# Patient Record
Sex: MALE
[De-identification: names, ages, dates, MRNs, and addresses within clinical notes are randomized per-mention and may not be internally consistent; named-entity substitution may affect disease eponyms.]

---

## 2017-11-15 ENCOUNTER — APPOINTMENT (OUTPATIENT)
Dept: UROLOGY | Facility: CLINIC | Age: 32
End: 2017-11-15
Payer: MEDICAID

## 2017-11-15 VITALS
BODY MASS INDEX: 26.51 KG/M2 | SYSTOLIC BLOOD PRESSURE: 107 MMHG | HEART RATE: 69 BPM | WEIGHT: 200 LBS | DIASTOLIC BLOOD PRESSURE: 68 MMHG | TEMPERATURE: 97.6 F | HEIGHT: 73 IN | OXYGEN SATURATION: 98 %

## 2017-11-15 PROCEDURE — 99203 OFFICE O/P NEW LOW 30 MIN: CPT

## 2018-01-25 ENCOUNTER — APPOINTMENT (OUTPATIENT)
Dept: UROLOGY | Facility: CLINIC | Age: 33
End: 2018-01-25
Payer: MEDICAID

## 2018-01-25 DIAGNOSIS — N44.2 BENIGN CYST OF TESTIS: ICD-10-CM

## 2018-01-25 PROCEDURE — 99213 OFFICE O/P EST LOW 20 MIN: CPT | Mod: 25

## 2018-01-25 PROCEDURE — 76870 US EXAM SCROTUM: CPT

## 2018-01-26 LAB
FSH SERPL-MCNC: 5.2 IU/L
LH SERPL-ACNC: 3.6 IU/L
TESTOST SERPL-MCNC: 192 NG/DL

## 2018-01-29 ENCOUNTER — MESSAGE (OUTPATIENT)
Age: 33
End: 2018-01-29

## 2018-02-21 ENCOUNTER — APPOINTMENT (OUTPATIENT)
Dept: UROLOGY | Facility: CLINIC | Age: 33
End: 2018-02-21

## 2019-03-25 ENCOUNTER — APPOINTMENT (OUTPATIENT)
Dept: FAMILY MEDICINE | Facility: CLINIC | Age: 34
End: 2019-03-25
Payer: MEDICAID

## 2019-03-25 VITALS
BODY MASS INDEX: 26.24 KG/M2 | SYSTOLIC BLOOD PRESSURE: 120 MMHG | HEART RATE: 68 BPM | HEIGHT: 73 IN | TEMPERATURE: 97.5 F | WEIGHT: 198 LBS | DIASTOLIC BLOOD PRESSURE: 80 MMHG | OXYGEN SATURATION: 98 % | RESPIRATION RATE: 16 BRPM

## 2019-03-25 DIAGNOSIS — Z13.1 ENCOUNTER FOR SCREENING FOR DIABETES MELLITUS: ICD-10-CM

## 2019-03-25 DIAGNOSIS — Z11.4 ENCOUNTER FOR SCREENING FOR HUMAN IMMUNODEFICIENCY VIRUS [HIV]: ICD-10-CM

## 2019-03-25 DIAGNOSIS — Z87.438 PERSONAL HISTORY OF OTHER DISEASES OF MALE GENITAL ORGANS: ICD-10-CM

## 2019-03-25 DIAGNOSIS — Z71.6 TOBACCO ABUSE COUNSELING: ICD-10-CM

## 2019-03-25 DIAGNOSIS — F17.200 NICOTINE DEPENDENCE, UNSPECIFIED, UNCOMPLICATED: ICD-10-CM

## 2019-03-25 DIAGNOSIS — Z83.49 FAMILY HISTORY OF OTHER ENDOCRINE, NUTRITIONAL AND METABOLIC DISEASES: ICD-10-CM

## 2019-03-25 DIAGNOSIS — Z86.39 PERSONAL HISTORY OF OTHER ENDOCRINE, NUTRITIONAL AND METABOLIC DISEASE: ICD-10-CM

## 2019-03-25 PROCEDURE — G0444 DEPRESSION SCREEN ANNUAL: CPT | Mod: 59

## 2019-03-25 PROCEDURE — 99406 BEHAV CHNG SMOKING 3-10 MIN: CPT

## 2019-03-25 PROCEDURE — G0442 ANNUAL ALCOHOL SCREEN 15 MIN: CPT

## 2019-03-25 PROCEDURE — 99385 PREV VISIT NEW AGE 18-39: CPT | Mod: 25

## 2019-03-25 PROCEDURE — 36415 COLL VENOUS BLD VENIPUNCTURE: CPT

## 2019-03-25 NOTE — ASSESSMENT
[FreeTextEntry1] : Health maintenance\par - comprehensive labs\par - negative depression and alcohol screening\par - declines vaccines \par \par Tonsillitis\par - swabbed at urgent care for strep and mono which were negative \par - completed course of abx and steroids\par - recommend CT neck and follow up with ENT\par \par Hyperhidrosis \par - stable\par - conservative management \par \par Smoking cessation\par - discussed smoking habits and cessation\par - patient does desire to quit\par - will try on his own without any medication

## 2019-03-25 NOTE — COUNSELING
[Healthy eating counseling provided] : healthy eating [Activity counseling provided] : activity [Good understanding] : Patient has a good understanding of disease, goals and obesity follow-up plan [Tobacco Use Cessation Intermediate Greater Than 3 Minutes Up to 10 Minutes] : Tobacco Use Cessation Intermediate Greater Than 3 Minutes Up to 10 Minutes [ - Annual Depression Screening] : Annual Depression Screening [de-identified] : diet - no restrictions\par exercise - on and off

## 2019-03-25 NOTE — HEALTH RISK ASSESSMENT
[Good] : ~his/her~  mood as  good [6-10] : 6-10 [0] : 2) Feeling down, depressed, or hopeless: Not at all (0) [HIV Test offered] : HIV Test offered [None] : None [With Family] : lives with family [Employed] : employed [] :  [Sexually Active] : sexually active [Feels Safe at Home] : Feels safe at home [Fully functional (bathing, dressing, toileting, transferring, walking, feeding)] : Fully functional (bathing, dressing, toileting, transferring, walking, feeding) [Fully functional (using the telephone, shopping, preparing meals, housekeeping, doing laundry, using] : Fully functional and needs no help or supervision to perform IADLs (using the telephone, shopping, preparing meals, housekeeping, doing laundry, using transportation, managing medications and managing finances) [Smoke Detector] : smoke detector [Carbon Monoxide Detector] : carbon monoxide detector [Seat Belt] :  uses seat belt [With Patient/Caregiver] : With Patient/Caregiver [Designated Healthcare Proxy] : Designated healthcare proxy [Name: ___] : Health Care Proxy's Name: [unfilled]  [Relationship: ___] : Relationship: [unfilled] [No falls in past year] : Patient reported no falls in the past year [] : No [[id number]] : 0 [Change in mental status noted] : No change in mental status noted [Language] : denies difficulty with language [Behavior] : denies difficulty with behavior [Reasoning] : denies difficulty with reasoning [High Risk Behavior] : no high risk behavior [Reports changes in hearing] : Reports no changes in hearing [Reports changes in vision] : Reports no changes in vision [Reports changes in dental health] : Reports no changes in dental health [Sunscreen] : does not use sunscreen [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

## 2019-03-25 NOTE — PHYSICAL EXAM

## 2019-03-25 NOTE — HISTORY OF PRESENT ILLNESS
[FreeTextEntry1] : cpe [de-identified] : Patient is here today for a physical.  \par He mentions that in the past 8-9 weeks he has had swelling of the right tonsil.  He initially also had sinus congestion and ear pain but those symptoms seem to be improving.  He went to urgent care when it first started and he was given amoxicillin.  He went a second time when he did not get better and was given steroids.  He still feels that his tonsil is very large on the right side. He denies any pain.

## 2019-03-28 LAB
25(OH)D3 SERPL-MCNC: 14.1 NG/ML
ALBUMIN SERPL ELPH-MCNC: 4.7 G/DL
ALP BLD-CCNC: 74 U/L
ALT SERPL-CCNC: 57 U/L
ANION GAP SERPL CALC-SCNC: 13 MMOL/L
APPEARANCE: CLEAR
AST SERPL-CCNC: 23 U/L
BASOPHILS # BLD AUTO: 0.06 K/UL
BASOPHILS NFR BLD AUTO: 0.7 %
BILIRUB SERPL-MCNC: 0.2 MG/DL
BILIRUBIN URINE: NEGATIVE
BLOOD URINE: NEGATIVE
BUN SERPL-MCNC: 21 MG/DL
CALCIUM SERPL-MCNC: 9.8 MG/DL
CHLORIDE SERPL-SCNC: 104 MMOL/L
CHOLEST SERPL-MCNC: 252 MG/DL
CHOLEST/HDLC SERPL: 5.4 RATIO
CO2 SERPL-SCNC: 24 MMOL/L
COLOR: YELLOW
CREAT SERPL-MCNC: 0.86 MG/DL
EOSINOPHIL # BLD AUTO: 0.41 K/UL
EOSINOPHIL NFR BLD AUTO: 5.1 %
ESTIMATED AVERAGE GLUCOSE: 117 MG/DL
GLUCOSE QUALITATIVE U: NEGATIVE
GLUCOSE SERPL-MCNC: 96 MG/DL
HBA1C MFR BLD HPLC: 5.7 %
HCT VFR BLD CALC: 47.2 %
HDLC SERPL-MCNC: 47 MG/DL
HGB BLD-MCNC: 14.9 G/DL
HIV1+2 AB SPEC QL IA.RAPID: NONREACTIVE
IMM GRANULOCYTES NFR BLD AUTO: 0.4 %
KETONES URINE: NEGATIVE
LDLC SERPL CALC-MCNC: 172 MG/DL
LEUKOCYTE ESTERASE URINE: NEGATIVE
LYMPHOCYTES # BLD AUTO: 3.23 K/UL
LYMPHOCYTES NFR BLD AUTO: 39.8 %
MAN DIFF?: NORMAL
MCHC RBC-ENTMCNC: 27.5 PG
MCHC RBC-ENTMCNC: 31.6 GM/DL
MCV RBC AUTO: 87.2 FL
MONOCYTES # BLD AUTO: 0.68 K/UL
MONOCYTES NFR BLD AUTO: 8.4 %
NEUTROPHILS # BLD AUTO: 3.7 K/UL
NEUTROPHILS NFR BLD AUTO: 45.6 %
NITRITE URINE: NEGATIVE
PH URINE: 6
PLATELET # BLD AUTO: 259 K/UL
POTASSIUM SERPL-SCNC: 4.3 MMOL/L
PROT SERPL-MCNC: 7.5 G/DL
PROTEIN URINE: NORMAL
RBC # BLD: 5.41 M/UL
RBC # FLD: 14.4 %
SODIUM SERPL-SCNC: 141 MMOL/L
SPECIFIC GRAVITY URINE: 1.03
T4 FREE SERPL-MCNC: 1.1 NG/DL
TRIGL SERPL-MCNC: 167 MG/DL
TSH SERPL-ACNC: 2.37 UIU/ML
UROBILINOGEN URINE: NORMAL
WBC # FLD AUTO: 8.11 K/UL

## 2019-04-24 ENCOUNTER — APPOINTMENT (OUTPATIENT)
Dept: OTOLARYNGOLOGY | Facility: CLINIC | Age: 34
End: 2019-04-24
Payer: MEDICAID

## 2019-04-24 VITALS
HEIGHT: 73 IN | HEART RATE: 64 BPM | DIASTOLIC BLOOD PRESSURE: 86 MMHG | SYSTOLIC BLOOD PRESSURE: 126 MMHG | WEIGHT: 198 LBS | BODY MASS INDEX: 26.24 KG/M2

## 2019-04-24 DIAGNOSIS — Z87.891 PERSONAL HISTORY OF NICOTINE DEPENDENCE: ICD-10-CM

## 2019-04-24 PROCEDURE — 99204 OFFICE O/P NEW MOD 45 MIN: CPT | Mod: 25

## 2019-04-24 PROCEDURE — 31575 DIAGNOSTIC LARYNGOSCOPY: CPT

## 2019-04-24 NOTE — HISTORY OF PRESENT ILLNESS
[No] : patient does not have a  history of radiation therapy [Tinnitus] : tinnitus [Otalgia] : otalgia [Nasal Congestion] : nasal congestion [Ear Pain] : ear pain [Sore Throat] : sore throat [Painful Swallowing] : painful swallowing [None] : No risk factors have been identified. [de-identified] : 33 y/o male smoker (20+years) w/ hx of a septoplasty when he was 14 y/o who states about 8 weeks ago he started experiencing a very bad sharp R sided headache which then led to intermittent R sided sharp ear pain. He then noticed his R tonsil was swollen after brushing his teeth and has been having swelling and discomfort in his R tonsil since. He states he also has been having ringing in the R ear for the past couple of days. He was seen by urgent care who gave him amoxicillin for 10 days but his symptoms did not resolve. He was then put on oral steroids which did not help him.  He also had a strep and flu test done which was negative. He then was sent for a CT of the neck that showed mild asymmetric soft tissue thickening in the left nasopharyngeal wall - only has report not the CD. He states he has also been experiencing intermittent  off balance and lightheaded when he bends down or with diff head positions that has been going on for about 1-2 years. He states he has been having mild to moderate R>L nasal congestion. Pt has no ear drainage, hearing loss, nasal discharge, epistaxis, sinus infections, facial pain, facial pressure, dysphagia, fevers or weight loss.\par  [de-identified] : septoplasty  [Anxiety] : no anxiety [Dizziness] : no dizziness [Headache] : no headache [Otorrhea] : no otorrhea [Vertigo] : no vertigo [Hearing Loss] : no hearing loss [Otitis Media with Effusion] : no otitis media with effusion [Recurrent Otitis Media] : no recurrent otitis media [Presbycusis] : no presbycusis [Congenital Ear Malformation] : no congenital ear malformation [Meniere Disease] : no Meniere disease [Otosclerosis] : no otosclerosis [Perilymphatic Fistula] : no perilymphatic fistula [Hypertension] : no hypertension [Early Onset Hearing Loss] : no early onset hearing loss [Smoking] : no smoking [Loud Noise Exposure] : no history of loud noise exposure [Stroke] : no stroke [Clear Rhinorrhea] : no clear rhinorrhea [Facial Pain] : no facial pain [Purulent Rhinorrhea] : no purulent rhinorrhea [Facial Pressure] : no facial pressure [Postnasal Drainage] : no postnasal drainage [Ear Pressure] : no ear pressure [Ear Fullness] : no ear fullness [Diplopia] : no diplopia [Allergic Rhinitis] : no allergic rhinitis [Environmental Allergies] : no environmental allergies [Seasonal Allergies] : no seasonal allergies [Environmental Allergens] : no environmental allergens [Allergies] : no allergies [Adenoidectomy] : no adenoidectomy [Asthma] : no asthma [Neck Pain] : no neck pain [Neck Redness] : no neck redness [Neck Mass] : no neck mass [Cold Intolerance] : no cold intolerance [Chills] : no chills [Fatigue] : no fatigue [Cough] : no cough [Heat Intolerance] : no heat intolerance [Hyperthyroidism] : no hyperthyroidism [Sialadenitis] : no sialadenitis [Hodgkin Disease] : no hodgkin disease [Non-Hodgkin Lymphoma] : no non-hodgkin lymphoma [Alcohol Use] : no alcohol use [Tobacco Use] : no tobacco use [Thyroid Cancer] : no thyroid cancer [Graves Disease] : no graves disease

## 2019-04-24 NOTE — PROCEDURE
[de-identified] : reason for laryngoscopy: unable to cooperate with mirror \par \par Fiberoptic laryngoscopy was performed on the patient.  R/b/a was explained to the patient and they agreed to proceed with procedure.  Nasal cavities were treated with lidocaine and afrin prior to laryngoscopy for comfort.  Nasal cavities: clear b/l, Nasopharynx: mild erythema and swelling + cobblestoning, Oropharynx/Hypopharynx:  tonsils appear symmetrically enlarged posteriorly, + lingual tonsillar hypertrophy no masses or lesions, posterior pharyngeal wall with cobblestoning.  No BOT hypertrophy, vallecula is clear of any masses or cysts, Supraglottis: epiglottis sharp with normal bilateral arytenoids, aryepiglottic folds, ventricles but with + interarytenoid edema consistent with reflux, Glottis: clear, TVCs mobile b/l.  Subglottis clear  No pooling of secretions in the pyriform sinus.  Airway patent\par \par Scope #:7\par \par

## 2019-04-24 NOTE — REASON FOR VISIT
[Initial Evaluation] : an initial evaluation for [Ear Pain] : ear pain [Tinnitus] : tinnitus [FreeTextEntry2] : R tonsil swelling and discomfort

## 2019-04-24 NOTE — ASSESSMENT
[FreeTextEntry1] : 33 y/o male w/ R sided Tonsil swelling and discomfort when swallowing w/ associated R sided headache and ear pain.  Significant reflux on exam which is likely the cause of symptoms, but given the tonsil asymmetry and the smoking history would proceed with biopsy if symptoms don’t resolve with reflux therapy.  Advised that with a CT scan which demonstrates no tonsillar mass - isolated blind tonsil biopsy will not be helpful and would recommend tonsillectomy to allow for complete biopsy.  Patient understands and will proceed with tonsillectomy if unimproved with 2 weeks of PPI \par \par GERD- Antireflux recommendations were given to the patient\par Rx: Omeprazole 40mg daily \par \par - f/up 2 weeks\par \par

## 2019-04-24 NOTE — PHYSICAL EXAM
[Midline] : trachea located in midline position [Normal] : no rashes [Nasal Endoscopy Performed] : nasal endoscopy was performed, see procedure section for findings [Laryngoscopy Performed] : laryngoscopy was performed, see procedure section for findings [de-identified] : R tonsil swelling and erythema, slightly enlarged compared to left but soft to palpation

## 2019-05-10 ENCOUNTER — APPOINTMENT (OUTPATIENT)
Dept: OTOLARYNGOLOGY | Facility: CLINIC | Age: 34
End: 2019-05-10

## 2019-05-14 ENCOUNTER — APPOINTMENT (OUTPATIENT)
Dept: OTOLARYNGOLOGY | Facility: CLINIC | Age: 34
End: 2019-05-14
Payer: MEDICAID

## 2019-05-14 VITALS
SYSTOLIC BLOOD PRESSURE: 133 MMHG | WEIGHT: 198 LBS | HEIGHT: 73 IN | BODY MASS INDEX: 26.24 KG/M2 | DIASTOLIC BLOOD PRESSURE: 89 MMHG | HEART RATE: 101 BPM

## 2019-05-14 PROCEDURE — 31575 DIAGNOSTIC LARYNGOSCOPY: CPT

## 2019-05-14 PROCEDURE — 99214 OFFICE O/P EST MOD 30 MIN: CPT | Mod: 25,57

## 2019-05-14 NOTE — PHYSICAL EXAM
[Nasal Endoscopy Performed] : nasal endoscopy was performed, see procedure section for findings [Normal] : mucosa is normal [Midline] : trachea located in midline position [Laryngoscopy Performed] : laryngoscopy was performed, see procedure section for findings [de-identified] : R tonsil swelling and erythema, moderately enlarged compared to left, appears larger than last exam, but soft to palpation

## 2019-05-14 NOTE — HISTORY OF PRESENT ILLNESS
[No] : patient does not have a  history of radiation therapy [Tinnitus] : tinnitus [Otalgia] : otalgia [Nasal Congestion] : nasal congestion [Ear Pain] : ear pain [Sore Throat] : sore throat [Painful Swallowing] : painful swallowing [None] : No risk factors have been identified. [de-identified] : 33 y/o male smoker (20+years) w/ hx of a septoplasty when he was 14 y/o who states about 8 weeks ago he started experiencing a very bad sharp R sided headache which then led to intermittent R sided sharp ear pain. He then noticed his R tonsil was swollen after brushing his teeth and has been having swelling and discomfort in his R tonsil since. He states he also has been having ringing in the R ear for the past couple of days. He was seen by urgent care who gave him amoxicillin for 10 days but his symptoms did not resolve. He was then put on oral steroids which did not help him.  He also had a strep and flu test done which was negative. He then was sent for a CT of the neck that showed mild asymmetric soft tissue thickening in the left nasopharyngeal wall - only has report not the CD. He states he has also been experiencing intermittent  off balance and lightheaded when he bends down or with diff head positions that has been going on for about 1-2 years. He states he has been having mild to moderate R>L nasal congestion. Pt has no ear drainage, hearing loss, nasal discharge, epistaxis, sinus infections, facial pain, facial pressure, dysphagia, fevers or weight loss.\par  [FreeTextEntry1] : At last visit noted Rt. sided tonsil swelling and Significant reflux.  Started on PPI, feels feeling of heart burn resolved.  He notes no improvement in throat feels tonsil swelling and discomfort continues.  Also notes intermittent fevers.  He notes went to urgent care again today and had repeat Flu and Strep cx and CXR that were neg.  He notes thy Rx Z-lito, Steroids and Albuterol MDI and told him not to start them until after this visit.  \par No weight loss.  [de-identified] : septoplasty  [Anxiety] : no anxiety [Dizziness] : no dizziness [Headache] : no headache [Hearing Loss] : no hearing loss [Otorrhea] : no otorrhea [Recurrent Otitis Media] : no recurrent otitis media [Vertigo] : no vertigo [Otitis Media with Effusion] : no otitis media with effusion [Presbycusis] : no presbycusis [Congenital Ear Malformation] : no congenital ear malformation [Meniere Disease] : no Meniere disease [Otosclerosis] : no otosclerosis [Perilymphatic Fistula] : no perilymphatic fistula [Hypertension] : no hypertension [Loud Noise Exposure] : no history of loud noise exposure [Smoking] : no smoking [Early Onset Hearing Loss] : no early onset hearing loss [Stroke] : no stroke [Facial Pain] : no facial pain [Clear Rhinorrhea] : no clear rhinorrhea [Facial Pressure] : no facial pressure [Purulent Rhinorrhea] : no purulent rhinorrhea [Postnasal Drainage] : no postnasal drainage [Ear Pressure] : no ear pressure [Diplopia] : no diplopia [Ear Fullness] : no ear fullness [Allergic Rhinitis] : no allergic rhinitis [Environmental Allergies] : no environmental allergies [Seasonal Allergies] : no seasonal allergies [Environmental Allergens] : no environmental allergens [Adenoidectomy] : no adenoidectomy [Allergies] : no allergies [Asthma] : no asthma [Neck Redness] : no neck redness [Neck Mass] : no neck mass [Neck Pain] : no neck pain [Chills] : no chills [Cold Intolerance] : no cold intolerance [Cough] : no cough [Fatigue] : no fatigue [Heat Intolerance] : no heat intolerance [Hyperthyroidism] : no hyperthyroidism [Sialadenitis] : no sialadenitis [Non-Hodgkin Lymphoma] : no non-hodgkin lymphoma [Hodgkin Disease] : no hodgkin disease [Alcohol Use] : no alcohol use [Tobacco Use] : no tobacco use [Graves Disease] : no graves disease [Thyroid Cancer] : no thyroid cancer

## 2019-05-14 NOTE — ASSESSMENT
[FreeTextEntry1] : 35 y/o male w/ R sided Tonsil swelling and discomfort when swallowing w/ associated R sided headache and ear pain.  Significant reflux on exam which is likely the cause of symptoms, but given the tonsil asymmetry and the smoking history would proceed with biopsy Since symptoms did not resolve with PPI.  \par - Will also send EBV panel - will call with results.  If EBV shows active mono, will defer tonsillectomy, if symptoms resolve in 2 weeks will DC procedure.  \par - Booking for tonsillectomy put in - plan to proceed with biopsy. \par \par GERD- Antireflux recommendations were given to the patient\par Rx: Omeprazole 40mg daily \par \par \par

## 2019-05-14 NOTE — PROCEDURE
[de-identified] : reason for laryngoscopy: unable to cooperate with mirror \par \par Fiberoptic laryngoscopy was performed on the patient.  R/b/a was explained to the patient and they agreed to proceed with procedure.  Nasal cavities were treated with lidocaine and afrin prior to laryngoscopy for comfort.  Nasal cavities: clear b/l, Nasopharynx: mild erythema and swelling + cobblestoning, Oropharynx/Hypopharynx:  tonsils appear symmetrically enlarged posteriorly, + lingual tonsillar hypertrophy no masses or lesions, posterior pharyngeal wall with cobblestoning.  No BOT hypertrophy, vallecula is clear of any masses or cysts, Supraglottis: epiglottis sharp with normal bilateral arytenoids, aryepiglottic folds, ventricles but with + interarytenoid edema consistent with reflux, Glottis: clear, TVCs mobile b/l.  Subglottis clear  No pooling of secretions in the pyriform sinus.  Airway patent\par \par Scope #:24\par \par

## 2019-05-16 ENCOUNTER — RESULT REVIEW (OUTPATIENT)
Age: 34
End: 2019-05-16

## 2019-05-16 LAB
EBV EA AB SER IA-ACNC: <5 U/ML
EBV EA AB TITR SER IF: POSITIVE
EBV EA IGG SER QL IA: 237 U/ML
EBV EA IGG SER-ACNC: NEGATIVE
EBV EA IGM SER IA-ACNC: NEGATIVE
EBV PATRN SPEC IB-IMP: NORMAL
EBV VCA IGG SER IA-ACNC: 205 U/ML
EBV VCA IGM SER QL IA: <10 U/ML
EPSTEIN-BARR VIRUS CAPSID ANTIGEN IGG: POSITIVE

## 2019-06-03 ENCOUNTER — APPOINTMENT (OUTPATIENT)
Dept: OTOLARYNGOLOGY | Facility: HOSPITAL | Age: 34
End: 2019-06-03

## 2019-06-11 ENCOUNTER — APPOINTMENT (OUTPATIENT)
Dept: OTOLARYNGOLOGY | Facility: CLINIC | Age: 34
End: 2019-06-11

## 2019-06-24 ENCOUNTER — APPOINTMENT (OUTPATIENT)
Dept: OTOLARYNGOLOGY | Facility: CLINIC | Age: 34
End: 2019-06-24
Payer: MEDICAID

## 2019-06-24 VITALS
SYSTOLIC BLOOD PRESSURE: 117 MMHG | HEIGHT: 73 IN | HEART RATE: 68 BPM | DIASTOLIC BLOOD PRESSURE: 82 MMHG | WEIGHT: 200 LBS | BODY MASS INDEX: 26.51 KG/M2

## 2019-06-24 DIAGNOSIS — R07.0 PAIN IN THROAT: ICD-10-CM

## 2019-06-24 PROCEDURE — 99214 OFFICE O/P EST MOD 30 MIN: CPT

## 2019-06-24 NOTE — PHYSICAL EXAM
[Midline] : trachea located in midline position [Normal] : no rashes [de-identified] : tonsils 2-3 plus  deep crypts  rt tonsil slightly bigger than left no exudate,palpation-tonsil soft

## 2019-06-24 NOTE — ASSESSMENT
[FreeTextEntry1] : ct neck 4/16/19 neg study\par mild asymmetry rt tonsil but soft to palpation\par considering hx  most likely inflamatory process\par laryngeal reflux\par restart omeprazole 40 q d

## 2019-06-24 NOTE — REVIEW OF SYSTEMS
[Ear Itch] : ear itch [Ear Pain] : ear pain [Lightheadedness] : lightheadedness [Nasal Congestion] : nasal congestion [Sinus Pain] : sinus pain [Sense Of Smell Problem] : sense of smell problem [Sinus Pressure] : sinus pressure [Throat Clearing] : throat clearing [Throat Pain] : throat pain [Throat Itching] : throat itching [Swelling Neck] : swelling neck [Throat Dryness] : throat dryness [Swelling Face] : face swelling [Eye Pain] : eye pain [Shortness Of Breath] : shortness of breath [Cough] : cough [Anxiety] : anxiety [Heartburn] : heartburn [Swollen Glands] : swollen glands [Negative] : Endocrine [Patient Intake Form Reviewed] : Patient intake form was reviewed [FreeTextEntry1] : difficulty swallowing  headache  chills  fatigue  chest pain   sweating at night  muscle aches

## 2019-07-02 ENCOUNTER — APPOINTMENT (OUTPATIENT)
Dept: OTOLARYNGOLOGY | Facility: CLINIC | Age: 34
End: 2019-07-02

## 2019-07-25 ENCOUNTER — APPOINTMENT (OUTPATIENT)
Dept: OTOLARYNGOLOGY | Facility: CLINIC | Age: 34
End: 2019-07-25
Payer: MEDICAID

## 2019-07-25 VITALS
BODY MASS INDEX: 26.51 KG/M2 | WEIGHT: 200 LBS | DIASTOLIC BLOOD PRESSURE: 72 MMHG | HEIGHT: 73 IN | HEART RATE: 68 BPM | SYSTOLIC BLOOD PRESSURE: 116 MMHG

## 2019-07-25 PROCEDURE — 99213 OFFICE O/P EST LOW 20 MIN: CPT

## 2019-07-25 RX ORDER — AMOXICILLIN AND CLAVULANATE POTASSIUM 875; 125 MG/1; MG/1
875-125 TABLET, COATED ORAL TWICE DAILY
Qty: 28 | Refills: 1 | Status: COMPLETED | COMMUNITY
Start: 2019-07-25 | End: 2019-08-22

## 2019-07-25 NOTE — HISTORY OF PRESENT ILLNESS
[de-identified] : persistent fullness rt side throat and discomfort and rt ear pain\par had 2 courses antibioitcs over several months

## 2019-07-25 NOTE — ASSESSMENT
[FreeTextEntry1] : persistent rt tonsil enlargement w pressure and discomfort rt throat and ear\par augmentin 875 #28\par pred 10 #20\par fu 3 weeks Dr. Delvalle\par omeprazole 40 #30 for reflux\par

## 2019-07-25 NOTE — PHYSICAL EXAM
[Midline] : trachea located in midline position [Laryngoscopy Performed] : laryngoscopy was performed, see procedure section for findings [Normal] : no rashes [de-identified] : rt tonsil 3 plus soft to palpation, left tonsil 1 plus

## 2019-07-29 DIAGNOSIS — Z11.3 ENCOUNTER FOR SCREENING FOR INFECTIONS WITH A PREDOMINANTLY SEXUAL MODE OF TRANSMISSION: ICD-10-CM

## 2019-07-30 DIAGNOSIS — Z11.9 ENCOUNTER FOR SCREENING FOR INFECTIOUS AND PARASITIC DISEASES, UNSPECIFIED: ICD-10-CM

## 2019-08-05 LAB
ABO + RH PNL BLD: NORMAL
BASOPHILS # BLD AUTO: 0.04 K/UL
BASOPHILS NFR BLD AUTO: 0.3 %
CMV IGG SERPL QL: 8.9 U/ML
CMV IGG SERPL-IMP: POSITIVE
CMV IGM SERPL QL: <8 AU/ML
CMV IGM SERPL QL: NEGATIVE
EOSINOPHIL # BLD AUTO: 0.1 K/UL
EOSINOPHIL NFR BLD AUTO: 0.8 %
HBV CORE IGG+IGM SER QL: NONREACTIVE
HBV SURFACE AB SER QL: NONREACTIVE
HBV SURFACE AB SER QL: NONREACTIVE
HBV SURFACE AG SER QL: NONREACTIVE
HBV SURFACE AG SER QL: NONREACTIVE
HCT VFR BLD CALC: 51.1 %
HCV AB SER QL: NONREACTIVE
HCV AB SER QL: NONREACTIVE
HCV S/CO RATIO: 0.14 S/CO
HCV S/CO RATIO: 0.15 S/CO
HGB A MFR BLD: 97.2 %
HGB A2 MFR BLD: 2.8 %
HGB BLD-MCNC: 15.4 G/DL
HGB FRACT BLD-IMP: NORMAL
HIV1+2 AB SPEC QL IA.RAPID: NONREACTIVE
HTLV I+II AB SER QL: NORMAL
IMM GRANULOCYTES NFR BLD AUTO: 0.4 %
LYMPHOCYTES # BLD AUTO: 4.3 K/UL
LYMPHOCYTES NFR BLD AUTO: 35.6 %
MAN DIFF?: NORMAL
MCHC RBC-ENTMCNC: 27.3 PG
MCHC RBC-ENTMCNC: 30.1 GM/DL
MCV RBC AUTO: 90.6 FL
MONOCYTES # BLD AUTO: 0.97 K/UL
MONOCYTES NFR BLD AUTO: 8 %
NEUTROPHILS # BLD AUTO: 6.61 K/UL
NEUTROPHILS NFR BLD AUTO: 54.9 %
PLATELET # BLD AUTO: 284 K/UL
RBC # BLD: 5.64 M/UL
RBC # FLD: 14.4 %
T PALLIDUM AB SER QL IA: NEGATIVE
T PALLIDUM AB SER QL IA: NEGATIVE
WBC # FLD AUTO: 12.07 K/UL

## 2019-08-26 ENCOUNTER — APPOINTMENT (OUTPATIENT)
Dept: OTOLARYNGOLOGY | Facility: CLINIC | Age: 34
End: 2019-08-26
Payer: MEDICAID

## 2019-08-26 VITALS
SYSTOLIC BLOOD PRESSURE: 135 MMHG | BODY MASS INDEX: 25.67 KG/M2 | WEIGHT: 200 LBS | HEART RATE: 75 BPM | DIASTOLIC BLOOD PRESSURE: 84 MMHG | HEIGHT: 74 IN

## 2019-08-26 PROCEDURE — 99213 OFFICE O/P EST LOW 20 MIN: CPT

## 2019-08-26 NOTE — PHYSICAL EXAM
[Normal] : no abnormal secretions [de-identified] : rt tonsil 2 plus soft to palpation left tonsil 1-2 plus soft

## 2019-08-26 NOTE — ASSESSMENT
[FreeTextEntry1] : rt chronic tonsillitis\par now 6 mo since first dx  no change in tonsil size\par reviewed option tonsilelctomy w biopsy\par psot op significant pain and 2-3%chance post op bleeding\par trial another antibiotic-levaquin 500 #10

## 2019-08-26 NOTE — HISTORY OF PRESENT ILLNESS
[de-identified] : tonsillar enlargement persistent\par rt sided throat pain\par completed augmentin and prednisone\par co sensation throat rt side\par ct neck neg 4/19

## 2019-09-05 ENCOUNTER — APPOINTMENT (OUTPATIENT)
Dept: OTOLARYNGOLOGY | Facility: CLINIC | Age: 34
End: 2019-09-05
Payer: MEDICAID

## 2019-09-05 VITALS — BODY MASS INDEX: 25.67 KG/M2 | HEIGHT: 74 IN | WEIGHT: 200 LBS

## 2019-09-05 PROCEDURE — 99213 OFFICE O/P EST LOW 20 MIN: CPT

## 2019-09-05 NOTE — REASON FOR VISIT
[Subsequent Evaluation] : a subsequent evaluation for [Ear Pain] : ear pain [Throat Pain] : throat pain [FreeTextEntry2] : tonsils

## 2019-09-05 NOTE — PHYSICAL EXAM
[Midline] : trachea located in midline position [de-identified] : rt tonsil2-3 plus soft no exudate  mild erythema soft to palpation  left tonsil 1-2 plus [Normal] : no rashes

## 2019-09-05 NOTE — ASSESSMENT
[FreeTextEntry1] : right throat and ear pain\par exam w mild asymmetry tonsils\par rec complete levaquin 500 #10\par consider mri\par consider tonsillectomy for recurrent tonsillitis\par

## 2019-10-07 ENCOUNTER — APPOINTMENT (OUTPATIENT)
Dept: FAMILY MEDICINE | Facility: CLINIC | Age: 34
End: 2019-10-07
Payer: MEDICAID

## 2019-10-07 ENCOUNTER — NON-APPOINTMENT (OUTPATIENT)
Age: 34
End: 2019-10-07

## 2019-10-07 VITALS
RESPIRATION RATE: 16 BRPM | SYSTOLIC BLOOD PRESSURE: 110 MMHG | BODY MASS INDEX: 25.41 KG/M2 | WEIGHT: 198 LBS | HEART RATE: 69 BPM | OXYGEN SATURATION: 98 % | DIASTOLIC BLOOD PRESSURE: 80 MMHG | HEIGHT: 74 IN

## 2019-10-07 DIAGNOSIS — J03.90 ACUTE TONSILLITIS, UNSPECIFIED: ICD-10-CM

## 2019-10-07 DIAGNOSIS — R06.02 SHORTNESS OF BREATH: ICD-10-CM

## 2019-10-07 DIAGNOSIS — R00.2 PALPITATIONS: ICD-10-CM

## 2019-10-07 PROCEDURE — 36415 COLL VENOUS BLD VENIPUNCTURE: CPT

## 2019-10-07 PROCEDURE — 93000 ELECTROCARDIOGRAM COMPLETE: CPT

## 2019-10-07 PROCEDURE — 99214 OFFICE O/P EST MOD 30 MIN: CPT | Mod: 25

## 2019-10-07 RX ORDER — PREDNISONE 20 MG/1
20 TABLET ORAL
Qty: 10 | Refills: 0 | Status: COMPLETED | COMMUNITY
Start: 2019-05-14

## 2019-10-07 RX ORDER — LEVOFLOXACIN 500 MG/1
500 TABLET, FILM COATED ORAL DAILY
Qty: 10 | Refills: 1 | Status: COMPLETED | COMMUNITY
Start: 2019-08-26 | End: 2019-10-07

## 2019-10-07 RX ORDER — OMEPRAZOLE 40 MG/1
40 CAPSULE, DELAYED RELEASE ORAL
Qty: 1 | Refills: 5 | Status: COMPLETED | COMMUNITY
Start: 2019-06-24 | End: 2019-10-07

## 2019-10-07 RX ORDER — OMEPRAZOLE 40 MG/1
40 CAPSULE, DELAYED RELEASE ORAL
Qty: 90 | Refills: 0 | Status: COMPLETED | COMMUNITY
Start: 2019-04-24 | End: 2019-10-07

## 2019-10-07 RX ORDER — AZITHROMYCIN 250 MG/1
250 TABLET, FILM COATED ORAL
Qty: 6 | Refills: 0 | Status: COMPLETED | COMMUNITY
Start: 2019-05-14

## 2019-10-07 RX ORDER — PREDNISONE 10 MG/1
10 TABLET ORAL TWICE DAILY
Qty: 20 | Refills: 1 | Status: COMPLETED | COMMUNITY
Start: 2019-07-25 | End: 2019-10-07

## 2019-10-07 NOTE — PHYSICAL EXAM
[No Acute Distress] : no acute distress [Well Nourished] : well nourished [Well Developed] : well developed [Normal Sclera/Conjunctiva] : normal sclera/conjunctiva [PERRL] : pupils equal round and reactive to light [Normal Outer Ear/Nose] : the outer ears and nose were normal in appearance [EOMI] : extraocular movements intact [Normal TMs] : both tympanic membranes were normal [No Lymphadenopathy] : no lymphadenopathy [No Accessory Muscle Use] : no accessory muscle use [Supple] : supple [No Respiratory Distress] : no respiratory distress  [Clear to Auscultation] : lungs were clear to auscultation bilaterally [Normal Rate] : normal rate  [No Murmur] : no murmur heard [Regular Rhythm] : with a regular rhythm [Normal S1, S2] : normal S1 and S2 [Soft] : abdomen soft [No Edema] : there was no peripheral edema [Normal Bowel Sounds] : normal bowel sounds [Non-distended] : non-distended [Non Tender] : non-tender [Normal Anterior Cervical Nodes] : no anterior cervical lymphadenopathy [Normal Posterior Cervical Nodes] : no posterior cervical lymphadenopathy [Grossly Normal Strength/Tone] : grossly normal strength/tone [No Rash] : no rash [No Focal Deficits] : no focal deficits [Normal Gait] : normal gait [Normal Affect] : the affect was normal [Normal Insight/Judgement] : insight and judgment were intact [de-identified] : enlarged right tonsil

## 2019-10-07 NOTE — ASSESSMENT
[FreeTextEntry1] : Intermittent palpitations\par Mild shortness of breath\par - recommend stopping supplements for gym \par - reduce caffeine intake\par - check labs\par - EKG today sinus rhythm, no abnormalities\par - cardio referral for additional screening - holter\par - work on quitting smoking\par \par Tonsillitis\par - recently seen at ENT\par - right tonsil very enlarged \par - will start antibiotics prescribed by ENT\par

## 2019-10-07 NOTE — HISTORY OF PRESENT ILLNESS
[FreeTextEntry8] : Patient is here today for an acute visit for palpitations. \par The first time he experienced it was last week, and he felt it after a workout.  He had taken some supplements and drank coffee which may have caused it. \par He had been feeling it intermittently, noticed the pulse in his neck.  It would last 10-15 minutes initially. \par Now today he has noticed that the palpitations have lasted all day and even now.  It is also accompanied by a slight shortness of breath. \par Doesn't feel that he is anxious right now. \par He has been trying not to smoke cigarettes for the past couple of days since his symptoms started.

## 2019-10-07 NOTE — REVIEW OF SYSTEMS
[Shortness Of Breath] : shortness of breath [Palpitations] : palpitations [Negative] : Neurological [Chest Pain] : no chest pain

## 2019-10-10 LAB
ALBUMIN SERPL ELPH-MCNC: 4.7 G/DL
ALP BLD-CCNC: 76 U/L
ALT SERPL-CCNC: 44 U/L
ANION GAP SERPL CALC-SCNC: 11 MMOL/L
AST SERPL-CCNC: 31 U/L
BASOPHILS # BLD AUTO: 0.07 K/UL
BASOPHILS NFR BLD AUTO: 0.8 %
BILIRUB SERPL-MCNC: 0.3 MG/DL
BUN SERPL-MCNC: 15 MG/DL
CALCIUM SERPL-MCNC: 9.8 MG/DL
CHLORIDE SERPL-SCNC: 105 MMOL/L
CHOLEST SERPL-MCNC: 196 MG/DL
CHOLEST/HDLC SERPL: 4.1 RATIO
CO2 SERPL-SCNC: 26 MMOL/L
CREAT SERPL-MCNC: 0.81 MG/DL
EOSINOPHIL # BLD AUTO: 0.32 K/UL
EOSINOPHIL NFR BLD AUTO: 3.6 %
ESTIMATED AVERAGE GLUCOSE: 108 MG/DL
GLUCOSE SERPL-MCNC: 99 MG/DL
HBA1C MFR BLD HPLC: 5.4 %
HCT VFR BLD CALC: 49 %
HDLC SERPL-MCNC: 48 MG/DL
HGB BLD-MCNC: 15.1 G/DL
IMM GRANULOCYTES NFR BLD AUTO: 0.2 %
LDLC SERPL CALC-MCNC: 113 MG/DL
LYMPHOCYTES # BLD AUTO: 2.77 K/UL
LYMPHOCYTES NFR BLD AUTO: 31.1 %
MAN DIFF?: NORMAL
MCHC RBC-ENTMCNC: 27.6 PG
MCHC RBC-ENTMCNC: 30.8 GM/DL
MCV RBC AUTO: 89.6 FL
MONOCYTES # BLD AUTO: 0.74 K/UL
MONOCYTES NFR BLD AUTO: 8.3 %
NEUTROPHILS # BLD AUTO: 4.99 K/UL
NEUTROPHILS NFR BLD AUTO: 56 %
PLATELET # BLD AUTO: 282 K/UL
POTASSIUM SERPL-SCNC: 4.2 MMOL/L
PROT SERPL-MCNC: 7.4 G/DL
RBC # BLD: 5.47 M/UL
RBC # FLD: 13.9 %
SODIUM SERPL-SCNC: 142 MMOL/L
T4 FREE SERPL-MCNC: 1.2 NG/DL
TRIGL SERPL-MCNC: 175 MG/DL
TSH SERPL-ACNC: 1.57 UIU/ML
WBC # FLD AUTO: 8.91 K/UL

## 2019-11-19 ENCOUNTER — APPOINTMENT (OUTPATIENT)
Dept: OTOLARYNGOLOGY | Facility: CLINIC | Age: 34
End: 2019-11-19
Payer: MEDICAID

## 2019-11-19 VITALS
HEART RATE: 57 BPM | BODY MASS INDEX: 25.03 KG/M2 | SYSTOLIC BLOOD PRESSURE: 122 MMHG | WEIGHT: 195 LBS | HEIGHT: 74 IN | DIASTOLIC BLOOD PRESSURE: 87 MMHG

## 2019-11-19 DIAGNOSIS — J31.2 CHRONIC PHARYNGITIS: ICD-10-CM

## 2019-11-19 DIAGNOSIS — G52.1 DISORDERS OF GLOSSOPHARYNGEAL NERVE: ICD-10-CM

## 2019-11-19 PROCEDURE — 31575 DIAGNOSTIC LARYNGOSCOPY: CPT

## 2019-11-19 PROCEDURE — 99213 OFFICE O/P EST LOW 20 MIN: CPT | Mod: 25

## 2019-11-19 NOTE — ASSESSMENT
[FreeTextEntry1] : persistent asymmetric tonsils w rt tonsil pain\par minimal response t antibioitcs\par symptoms persisting over 8 months\par cefdinir 300 #28\par fu Dr. Delvalle

## 2019-11-19 NOTE — PROCEDURE
[de-identified] : Indication for procedure:Unable to examine laryngeal structures with mirror exam\par Scope # 4\par Topical anesthesia with viscous xylocaine 2% is applied to the anterior nares.\par A flexible fiberoptic laryngoscope is than introduced through the nares.\par The nasopharynx is clear without mass or inflammation.\par The posterior pharyngeal wall is unremarkable.\par The tongue base and vallecula are unremarkable.  The hypopharynx is unremarkable and unobstructed.\par The supraglottic larynx is within normal limits.\par Both vocal cords are fully mobile with no nodule, polyp or other lesion present.\par There is no edema or erythema overlying the arytenoid cartilages or the inter-arytenoid space.\par The subglottic space is clear.\par The voice has a normal quality.\par

## 2019-11-19 NOTE — PHYSICAL EXAM
[Midline] : trachea located in midline position [Laryngoscopy Performed] : laryngoscopy was performed, see procedure section for findings [Normal] : no rashes [de-identified] : rt tonsil 3 plus left tonsil 1-2 plus

## 2019-12-06 ENCOUNTER — APPOINTMENT (OUTPATIENT)
Dept: OTOLARYNGOLOGY | Facility: CLINIC | Age: 34
End: 2019-12-06
Payer: MEDICAID

## 2019-12-13 ENCOUNTER — APPOINTMENT (OUTPATIENT)
Dept: OTOLARYNGOLOGY | Facility: CLINIC | Age: 34
End: 2019-12-13
Payer: MEDICAID

## 2019-12-13 VITALS
SYSTOLIC BLOOD PRESSURE: 121 MMHG | HEIGHT: 73 IN | RESPIRATION RATE: 16 BRPM | WEIGHT: 195 LBS | DIASTOLIC BLOOD PRESSURE: 84 MMHG | HEART RATE: 59 BPM | BODY MASS INDEX: 25.84 KG/M2

## 2019-12-13 PROCEDURE — 99214 OFFICE O/P EST MOD 30 MIN: CPT | Mod: 25

## 2019-12-13 PROCEDURE — 31575 DIAGNOSTIC LARYNGOSCOPY: CPT

## 2019-12-13 NOTE — PROCEDURE
[de-identified] : Procedure performed: laryngeal Endoscopy- Diagnostic\par Pre-op/post op indication: globus\par Verbal and/or written consent obtained from patient\par Scope #: 9, flexible fiber optic telescope used.\par Scope was introduced through the nose passed on the floor of the nose to the nasopharynx and then followed down the soft palate to the lower pharynx. The tongue Base, Larynx, Hypopharynx were examined. Base of tongue was symmetric, vallecular was clear, epiglottis was not deformed, Glottis with fully mobile vocal cords without lesions or masses. Visualized subglottis clear. Postcricoid area with erythema and edema. + Severe reflux changes + Intra-arytenoid bar. No pooling of secretions, masses or lesions. Airway patent, no foreign body visualized. No glottic/supraglottic edema. True vocal cords, arytenoids, vestibular folds, ventricles, pyriform sinuses, and aryepiglottic folds appear normal bilaterally. Vocal cords mobile with good contact b/l.  + adenoid tissue extending into nasopharynx on left\par \par \par

## 2019-12-13 NOTE — ASSESSMENT
[FreeTextEntry1] : Pt with recurrent tonsillitis R>L with 5-6 courses of oral abx within the pat 10 months. On exam today, right 2-3+, left 1-2+, adenoid tissue extending into nasopharynx on left.\par - Risks benefits and alternatives of tonsillectomy discussed at length. Risks including bleeding that may be severe and difficult to control the back of the throat, infection, significant throat pain that can cause dehydration, voice changes, VPI, persistent symptoms, injury to mouth and teeth, change in taste etc. were discussed.\par - Discussed quitting smoking \par - Will follow closely and see if quitting smoking and reflux treatment will improve frequency of infections prior to scheduling surgery\par \par Pt with globus and throat clearing with significant laryngeal reflux on exam. This is the most probable cause at this point. \par - Will treat for laryngeal reflux and consider other treatments if refractory\par - Will proceed to start lifestyle regimen to reduce overproduction of acid and reduce laryngeal reflux including avoiding caffein, alcohol, eating before bed, spicy and fatty foods, and head elevation at night etc. Handout detailing regiment also given\par \par \par I personally saw and examined PEARL SAHU in detail. I spoke to ERIN Quintero regarding the assessment and plan of care.  I preformed the procedures and I reviewed the above assessment and plan of care, and agree. I have made changes in changes in the body of the note where appropriate.\par \par

## 2019-12-13 NOTE — CONSULT LETTER
[FreeTextEntry1] : Dear Dr. JASON THOMAS \par I had the pleasure of evaluating your patient PEARL SAHU  thank you for allowing us to participate in their care. please see full note detailing our visit below.\par If you have any questions, please do not hesitate to call me and I would be happy to discuss further. \par \par Jorge Luis Delvalle M.D.\par Attending Physician,  \par Department of Otolaryngology - Head and Neck Surgery\par [facility] \par Office: [phone]\par Fax: [phone]\par

## 2019-12-13 NOTE — HISTORY OF PRESENT ILLNESS
[de-identified] : 35 y/o male with 10 month history of recurrent tonsillitis treated with 5-6 courses of oral abx and 3-4 course of oral steroids. He notes his right tonsil is larger than the left, is more painful than the left when infected and had pustular exudate on right tonsil recently- just completed Cefdinir course. Had CT neck w/ contrast in April 2019 due to asymmetry which showed mild asymmetric soft tissue thickening along the left nasopharyngeal wall- otherwise WNL.\par + difficulty swallowing on right\par + right ear pain- sharp\par + right neck tenderness\par + current smoker- 20 years\par + vocal changes- sounds nasal \par No fevers or unintentional weight loss.

## 2020-01-03 ENCOUNTER — APPOINTMENT (OUTPATIENT)
Dept: OTOLARYNGOLOGY | Facility: CLINIC | Age: 35
End: 2020-01-03
Payer: SELF-PAY

## 2020-01-03 VITALS — HEIGHT: 73 IN | BODY MASS INDEX: 25.84 KG/M2 | WEIGHT: 195 LBS

## 2020-01-03 DIAGNOSIS — J35.8 OTHER CHRONIC DISEASES OF TONSILS AND ADENOIDS: ICD-10-CM

## 2020-01-03 PROCEDURE — 31575 DIAGNOSTIC LARYNGOSCOPY: CPT

## 2020-01-03 PROCEDURE — 99213 OFFICE O/P EST LOW 20 MIN: CPT | Mod: 25

## 2020-01-03 NOTE — HISTORY OF PRESENT ILLNESS
[de-identified] : 35 y/o male with 1 year history of recurrent tonsillitis treated with 6-7 courses of oral abx and 4-5 course of oral steroids. He notes his right tonsil is larger than the left and is more painful than the left when infected. Had CT neck w/ contrast in April 2019 due to asymmetry which showed mild asymmetric soft tissue thickening along the left nasopharyngeal wall- otherwise WNL. Since last visit he quit smoking but hasn't been on any reflux medications.\par He was also given another course of oral abx and oral steroids for tonsillitis. \par No fevers or unintentional weight loss.

## 2020-01-03 NOTE — CONSULT LETTER
[FreeTextEntry1] : Dear Dr. JASON THOMAS \par I had the pleasure of evaluating your patient PEARL SAHU  thank you for allowing us to participate in their care. please see full note detailing our visit below.\par If you have any questions, please do not hesitate to call me and I would be happy to discuss further. \par \par Jorge Luis Delvalle M.D.\par Attending Physician,  \par Department of Otolaryngology - Head and Neck Surgery\par [facility] \par Office: [phone]\par Fax: [phone]\par

## 2020-01-03 NOTE — REASON FOR VISIT
[Subsequent Evaluation] : a subsequent evaluation for [FreeTextEntry2] : F/up recurrent tonsillitis

## 2020-01-03 NOTE — ASSESSMENT
[FreeTextEntry1] : Pt with recurrent tonsillitis R>L with 5-6 courses of oral abx within the past 12 months. On exam today, right 2-3+, left 1-2+, adenoid tissue extending into nasopharynx on left.\par - Risks benefits and alternatives of tonsillectomy discussed at length. Risks including bleeding that may be severe and difficult to control the back of the throat, infection, significant throat pain that can cause dehydration, voice changes, VPI, persistent symptoms, injury to mouth and teeth, change in taste etc. were discussed.\par - Will treat reflux prior to scheduling surgery\par \par Pt with globus and throat clearing with significant laryngeal reflux on exam. This is the most probable cause at this point. \par - Will treat for laryngeal reflux and consider other treatments if refractory\par - Will proceed to start lifestyle regimen to reduce overproduction of acid and reduce laryngeal reflux including avoiding caffein, alcohol, eating before bed, spicy and fatty foods, and head elevation at night etc. Handout detailing regiment also given\par - Omeprazole 40mg QD\par \par \par I personally saw and examined PEARL SAHU in detail. I spoke to ERIN Quintero regarding the assessment and plan of care.  I preformed the procedures and I reviewed the above assessment and plan of care, and agree. I have made changes in changes in the body of the note where appropriate.\par \par

## 2020-01-03 NOTE — PROCEDURE
[de-identified] : Procedure performed: laryngeal Endoscopy- Diagnostic\par Pre-op/post op indication: globus\par Verbal and/or written consent obtained from patient\par Scope #: 9, flexible fiber optic telescope used.\par Scope was introduced through the nose passed on the floor of the nose to the nasopharynx and then followed down the soft palate to the lower pharynx. The tongue Base, Larynx, Hypopharynx were examined. Base of tongue was symmetric, vallecular was clear, epiglottis was not deformed, Glottis with fully mobile vocal cords without lesions or masses. Visualized subglottis clear. Postcricoid area with erythema and edema. + Severe reflux changes + Intra-arytenoid bar. No pooling of secretions, masses or lesions. Airway patent, no foreign body visualized. No glottic/supraglottic edema. True vocal cords, arytenoids, vestibular folds, ventricles, pyriform sinuses, and aryepiglottic folds appear normal bilaterally. Vocal cords mobile with good contact b/l.  + adenoid tissue extending into nasopharynx on left\par \par \par

## 2020-01-07 ENCOUNTER — APPOINTMENT (OUTPATIENT)
Dept: FAMILY MEDICINE | Facility: CLINIC | Age: 35
End: 2020-01-07
Payer: SELF-PAY

## 2020-01-07 ENCOUNTER — EMERGENCY (EMERGENCY)
Facility: HOSPITAL | Age: 35
LOS: 1 days | Discharge: LEFT BEFORE DISCHARGE | End: 2020-01-07

## 2020-01-07 VITALS
HEIGHT: 73 IN | DIASTOLIC BLOOD PRESSURE: 91 MMHG | OXYGEN SATURATION: 97 % | TEMPERATURE: 98 F | WEIGHT: 199.96 LBS | RESPIRATION RATE: 18 BRPM | HEART RATE: 75 BPM | SYSTOLIC BLOOD PRESSURE: 138 MMHG

## 2020-01-07 VITALS
OXYGEN SATURATION: 98 % | DIASTOLIC BLOOD PRESSURE: 82 MMHG | RESPIRATION RATE: 16 BRPM | SYSTOLIC BLOOD PRESSURE: 120 MMHG | HEART RATE: 60 BPM | BODY MASS INDEX: 25.84 KG/M2 | WEIGHT: 195 LBS | HEIGHT: 73 IN

## 2020-01-07 DIAGNOSIS — G43.909 MIGRAINE, UNSPECIFIED, NOT INTRACTABLE, W/OUT STATUS MIGRAINOSUS: ICD-10-CM

## 2020-01-07 DIAGNOSIS — M62.838 OTHER MUSCLE SPASM: ICD-10-CM

## 2020-01-07 PROCEDURE — 99214 OFFICE O/P EST MOD 30 MIN: CPT

## 2020-01-07 RX ORDER — CEFDINIR 300 MG/1
300 CAPSULE ORAL TWICE DAILY
Qty: 28 | Refills: 0 | Status: DISCONTINUED | COMMUNITY
Start: 2019-11-19 | End: 2020-01-07

## 2020-01-07 RX ORDER — CLARITHROMYCIN 500 MG/1
500 TABLET, FILM COATED ORAL
Qty: 20 | Refills: 0 | Status: DISCONTINUED | COMMUNITY
Start: 2019-12-08 | End: 2020-01-07

## 2020-01-07 NOTE — REVIEW OF SYSTEMS
[Vision Problems] : vision problems [Headache] : headache [Negative] : Integumentary [FreeTextEntry4] : neck muscle spasm

## 2020-01-07 NOTE — HISTORY OF PRESENT ILLNESS
[FreeTextEntry8] : Patient is here today for an acute visit. \par He was recently sick a few days before the new year. \par He did not take anything for his illness at this time.\par About one month ago he had completed a course of steroids and antibiotics. \par He noticed about 3 days ago he started having headaches.  He also gets pain in his right eye and ear. \par Last night he noticed the headache was keeping him up from sleep. \par He occasionally gets nausea and noticed slightly blurry vision in the one eye.\par His neck feels very tense and with spasms as well on the right side.\par Does have chronically enlarged right tonsil and is undergoing work up with ENT.\par He went to the ER this morning but he walked out and did not get treatment.

## 2020-01-07 NOTE — PHYSICAL EXAM
[No Acute Distress] : no acute distress [Normal Sclera/Conjunctiva] : normal sclera/conjunctiva [Well Developed] : well developed [Well Nourished] : well nourished [Normal Outer Ear/Nose] : the outer ears and nose were normal in appearance [PERRL] : pupils equal round and reactive to light [EOMI] : extraocular movements intact [Normal TMs] : both tympanic membranes were normal [Supple] : supple [No Respiratory Distress] : no respiratory distress  [No Accessory Muscle Use] : no accessory muscle use [Clear to Auscultation] : lungs were clear to auscultation bilaterally [Normal Rate] : normal rate  [Normal S1, S2] : normal S1 and S2 [Regular Rhythm] : with a regular rhythm [No Spinal Tenderness] : no spinal tenderness [Grossly Normal Strength/Tone] : grossly normal strength/tone [Normal Gait] : normal gait [No Focal Deficits] : no focal deficits [No Rash] : no rash [Normal Affect] : the affect was normal [Normal Insight/Judgement] : insight and judgment were intact [de-identified] : neck muscle spasm on right side [de-identified] : enlarged right tonsil

## 2020-01-07 NOTE — ASSESSMENT
[FreeTextEntry1] : Migraine\par Neck muscle spasm\par - unclear if any link to chronic tonsil inflammation on same side as current problem\par - recently finished abx and steroids\par - headache is persistent, not relieved with advil or excedrin\par - trial on sumatriptan for migraine\par - trial on cylclobenzaprine to relieve neck spasm\par - if not improved or worsening in next 2-3 days, follow up\par - will need MRI imaging but prefers to try medication first as he currently does not have active insurance.  If not better in 2-3 days will get imaging done\par \par Nasal congestion\par Chronic tonsil enlargement/ tonsillitis\par - possible cause of right sided head inflammation\par - recommend follow up with ENT - consider removal \par - recently took abx and steroids

## 2020-01-07 NOTE — ED ADULT TRIAGE NOTE - CHIEF COMPLAINT QUOTE
I have a bad tension headache on the right side for the past 3 days, today is getting worse in the back of the right head, right ear, and right.  worse with cough. no other focal deficits.

## 2020-02-14 ENCOUNTER — APPOINTMENT (OUTPATIENT)
Dept: OTOLARYNGOLOGY | Facility: CLINIC | Age: 35
End: 2020-02-14
Payer: COMMERCIAL

## 2020-02-14 VITALS — HEIGHT: 73 IN | WEIGHT: 195 LBS | BODY MASS INDEX: 25.84 KG/M2

## 2020-02-14 DIAGNOSIS — J35.01 CHRONIC TONSILLITIS: ICD-10-CM

## 2020-02-14 DIAGNOSIS — K21.9 GASTRO-ESOPHAGEAL REFLUX DISEASE W/OUT ESOPHAGITIS: ICD-10-CM

## 2020-02-14 DIAGNOSIS — J34.2 DEVIATED NASAL SEPTUM: ICD-10-CM

## 2020-02-14 DIAGNOSIS — J35.1 HYPERTROPHY OF TONSILS: ICD-10-CM

## 2020-02-14 PROCEDURE — 31231 NASAL ENDOSCOPY DX: CPT

## 2020-02-14 PROCEDURE — 99214 OFFICE O/P EST MOD 30 MIN: CPT | Mod: 25

## 2020-02-14 NOTE — CONSULT LETTER
[FreeTextEntry1] : Dear Dr. JASON THOMAS \par I had the pleasure of evaluating your patient PEARL SAHU  thank you for allowing us to participate in their care. please see full note detailing our visit below.\par If you have any questions, please do not hesitate to call me and I would be happy to discuss further. \par \par Jorge Luis Delvalle M.D.\par Attending Physician,  \par Department of Otolaryngology - Head and Neck Surgery\par [facility] \par Office: [phone]\par Fax: [phone]\par

## 2020-02-14 NOTE — HISTORY OF PRESENT ILLNESS
[de-identified] : 35 y/o male with 1 year history of recurrent tonsillitis treated with 6-7 courses of oral abx and 4-5 course of oral steroids. He notes his right tonsil is larger than the left and is more painful than the left when infected. Had CT neck w/ contrast in April 2019 due to asymmetry which showed mild asymmetric soft tissue thickening along the left nasopharyngeal wall- otherwise WNL. Since last visit he quit smoking but hasn't been on any reflux medications.\par He was also given another course of oral abx and oral steroids for tonsillitis. \par No fevers or unintentional weight loss. [FreeTextEntry1] : did PPi, feels throat is better, some new pain with arm extension and some movements in neck \par no tonsil infections \par \par feels right nostril very dry and congested with some dry crusting blood for the past month \par no runny no sinus pressure\par improves with showers\par

## 2020-02-14 NOTE — PROCEDURE
[FreeTextEntry6] : Procedure performed: Nasal Endoscopy- Diagnostic\par Pre-op indication(s): nasal congestion\par Post-op indication(s): nasal congestion \par Verbal and/or written consent obtained from patient\par Anterior rhinoscopy insufficient to account for symptoms\par Scope #: 3,  flexible fiber optic telescope \par The scope was introduced in the nasal passage between the middle and inferior turbinates to exam the inferior portion of the middle meatus and the fontanelle, as well as the maxillary ostia.  Next, the scope was passed medically and posteriorly to the middle turbinates to examine the sphenoethmoid recess and the superior turbinate region.\par Upon visualization the finders are as follows:\par Nasal Septum: Right septal deviation\par Bilateral - Mucosa: boggy turbinates, Mucous: scant, Polyp: not seen, Inferior Turbinate: boggy, Middle Turbinate: normal, Superior Turbinate: normal, Inferior Meatus: narrow, Middle Meatus: narrow, Super Meatus:normal, Sphenoethmoidal Recess: clear\par  [de-identified] : Procedure performed: laryngeal Endoscopy- Diagnostic\par Pre-op/post op indication: globus\par Verbal and/or written consent obtained from patient\par Scope #: 9, flexible fiber optic telescope used.\par Scope was introduced through the nose passed on the floor of the nose to the nasopharynx and then followed down the soft palate to the lower pharynx. The tongue Base, Larynx, Hypopharynx were examined. Base of tongue was symmetric, vallecular was clear, epiglottis was not deformed, Glottis with fully mobile vocal cords without lesions or masses. Visualized subglottis clear. Postcricoid area with erythema and edema. + mild reflux changes + Intra-arytenoid bar. No pooling of secretions, masses or lesions. Airway patent, no foreign body visualized. No glottic/supraglottic edema. True vocal cords, arytenoids, vestibular folds, ventricles, pyriform sinuses, and aryepiglottic folds appear normal bilaterally. Vocal cords mobile with good contact b/l.  + adenoid tissue extending into nasopharynx on left\par \par \par

## 2020-02-14 NOTE — ASSESSMENT
[FreeTextEntry1] : Pt with recurrent tonsillitis R>L with 5-6 courses of oral abx within the past 12 months. On exam today, right 2-3+, left 1-2+, adenoid tissue extending into nasopharynx on left.\par sx improved with LPR Tx, jose e lmonitor infections\par \par \par right nasal congestion with right NSD - breathing ok overall, no lesions seen, will try moisturization \par \par neck pain - likely muscular, will try Aleve, warm compress, massage\par \par Pt with globus and throat clearing with significant laryngeal reflux on exam. This is the most probable cause at this point. doing well with Tx \par - Will treat for laryngeal reflux and consider other treatments if refractory\par - Will proceed to start lifestyle regimen to reduce overproduction of acid and reduce laryngeal reflux including avoiding caffein, alcohol, eating before bed, spicy and fatty foods, and head elevation at night etc. Handout detailing regiment also given\par - Omeprazole 40mg QD

## 2020-09-08 ENCOUNTER — APPOINTMENT (OUTPATIENT)
Dept: FAMILY MEDICINE | Facility: CLINIC | Age: 35
End: 2020-09-08
Payer: COMMERCIAL

## 2020-09-08 VITALS
HEIGHT: 73 IN | SYSTOLIC BLOOD PRESSURE: 118 MMHG | DIASTOLIC BLOOD PRESSURE: 78 MMHG | RESPIRATION RATE: 16 BRPM | OXYGEN SATURATION: 99 % | TEMPERATURE: 98.2 F | HEART RATE: 66 BPM | WEIGHT: 211 LBS | BODY MASS INDEX: 27.96 KG/M2

## 2020-09-08 DIAGNOSIS — Z00.00 ENCOUNTER FOR GENERAL ADULT MEDICAL EXAMINATION W/OUT ABNORMAL FINDINGS: ICD-10-CM

## 2020-09-08 DIAGNOSIS — Z13.29 ENCOUNTER FOR SCREENING FOR OTHER SUSPECTED ENDOCRINE DISORDER: ICD-10-CM

## 2020-09-08 PROCEDURE — G0442 ANNUAL ALCOHOL SCREEN 15 MIN: CPT

## 2020-09-08 PROCEDURE — 99395 PREV VISIT EST AGE 18-39: CPT | Mod: 25

## 2020-09-08 PROCEDURE — 36415 COLL VENOUS BLD VENIPUNCTURE: CPT

## 2020-09-08 NOTE — PHYSICAL EXAM
[No Acute Distress] : no acute distress [Well Nourished] : well nourished [Well-Appearing] : well-appearing [Well Developed] : well developed [Normal Sclera/Conjunctiva] : normal sclera/conjunctiva [PERRL] : pupils equal round and reactive to light [Normal Outer Ear/Nose] : the outer ears and nose were normal in appearance [EOMI] : extraocular movements intact [Normal TMs] : both tympanic membranes were normal [No JVD] : no jugular venous distention [No Lymphadenopathy] : no lymphadenopathy [Supple] : supple [Thyroid Normal, No Nodules] : the thyroid was normal and there were no nodules present [No Respiratory Distress] : no respiratory distress  [Clear to Auscultation] : lungs were clear to auscultation bilaterally [No Accessory Muscle Use] : no accessory muscle use [Normal Rate] : normal rate  [Normal S1, S2] : normal S1 and S2 [Regular Rhythm] : with a regular rhythm [No Murmur] : no murmur heard [No Carotid Bruits] : no carotid bruits [No Abdominal Bruit] : a ~M bruit was not heard ~T in the abdomen [No Varicosities] : no varicosities [Pedal Pulses Present] : the pedal pulses are present [No Edema] : there was no peripheral edema [No Extremity Clubbing/Cyanosis] : no extremity clubbing/cyanosis [Soft] : abdomen soft [No Palpable Aorta] : no palpable aorta [Non Tender] : non-tender [Non-distended] : non-distended [No Masses] : no abdominal mass palpated [No HSM] : no HSM [Normal Bowel Sounds] : normal bowel sounds [Normal Posterior Cervical Nodes] : no posterior cervical lymphadenopathy [Normal Anterior Cervical Nodes] : no anterior cervical lymphadenopathy [No CVA Tenderness] : no CVA  tenderness [No Spinal Tenderness] : no spinal tenderness [No Joint Swelling] : no joint swelling [Grossly Normal Strength/Tone] : grossly normal strength/tone [No Rash] : no rash [Normal Gait] : normal gait [Coordination Grossly Intact] : coordination grossly intact [Deep Tendon Reflexes (DTR)] : deep tendon reflexes were 2+ and symmetric [Normal Affect] : the affect was normal [Normal Insight/Judgement] : insight and judgment were intact [de-identified] : large left sided tonsil

## 2020-09-08 NOTE — HEALTH RISK ASSESSMENT
[Very Good] : ~his/her~  mood as very good [] : Yes [No] : No [Yes] : In the past 12 months have you used drugs other than those required for medical reasons? Yes [None] : None [Employed] : employed [With Family] : lives with family [] :  [Sexually Active] : sexually active [Feels Safe at Home] : Feels safe at home [Smoke Detector] : smoke detector [Carbon Monoxide Detector] : carbon monoxide detector [Seat Belt] :  uses seat belt [Sunscreen] : uses sunscreen [With Patient/Caregiver] : With Patient/Caregiver [Designated Healthcare Proxy] : Designated healthcare proxy [Name: ___] : Health Care Proxy's Name: [unfilled]  [Relationship: ___] : Relationship: [unfilled] [Audit-CScore] : 0 [Change in mental status noted] : No change in mental status noted [Language] : denies difficulty with language [High Risk Behavior] : no high risk behavior [Reports changes in hearing] : Reports no changes in hearing [Reports changes in vision] : Reports no changes in vision [Reports changes in dental health] : Reports no changes in dental health [TB Exposure] : is not being exposed to tuberculosis [HepatitisCDate] : 7/2019 [HIVDate] : 7/2019 [AdvancecareDate] : 9/2020

## 2020-09-08 NOTE — HISTORY OF PRESENT ILLNESS
[FreeTextEntry1] : annual [de-identified] : Mr. PEARL SAHU is a 35 year old male presenting for an annual\par ENT work up done for a possible enlarged tonsil.\par Tobacco abuse, reports that he has reduced. Social smokaer 2-3 a month. used to be 1ppd till 3/20.\par Smokes marijuana which helps with hyperhidrosis. \par

## 2020-09-08 NOTE — ASSESSMENT
[FreeTextEntry1] : Health maintenance\par comprehensive labs\par negative depression and alcohol screening\par declines vaccines \par \par Hx of ? Tonsillitis/ Tonsillar enlargement\par w/u with ENT done \par treated with/ completed course of abx and steroids\par was advised to stop smoking. Quitting did not help.\par \par Hyperhidrosis \par conservative management \par reports marijuana helps\par \par Smoking cessation\par social smoker 2-3 cigs a month.\par BMI 27 has gained weight recently\par \par Planning to start a regular exercise routine.

## 2020-09-09 LAB
25(OH)D3 SERPL-MCNC: 19.8 NG/ML
ALBUMIN SERPL ELPH-MCNC: 4.7 G/DL
ALP BLD-CCNC: 78 U/L
ALT SERPL-CCNC: 41 U/L
ANION GAP SERPL CALC-SCNC: 14 MMOL/L
APPEARANCE: CLEAR
AST SERPL-CCNC: 20 U/L
BASOPHILS # BLD AUTO: 0.08 K/UL
BASOPHILS NFR BLD AUTO: 0.9 %
BILIRUB SERPL-MCNC: 0.2 MG/DL
BILIRUBIN URINE: NEGATIVE
BLOOD URINE: NEGATIVE
BUN SERPL-MCNC: 19 MG/DL
CALCIUM SERPL-MCNC: 9.7 MG/DL
CHLORIDE SERPL-SCNC: 105 MMOL/L
CHOLEST SERPL-MCNC: 220 MG/DL
CHOLEST/HDLC SERPL: 5.4 RATIO
CO2 SERPL-SCNC: 20 MMOL/L
COLOR: YELLOW
CREAT SERPL-MCNC: 0.82 MG/DL
EOSINOPHIL # BLD AUTO: 0.61 K/UL
EOSINOPHIL NFR BLD AUTO: 6.5 %
ESTIMATED AVERAGE GLUCOSE: 114 MG/DL
GLUCOSE QUALITATIVE U: NEGATIVE
GLUCOSE SERPL-MCNC: 109 MG/DL
HBA1C MFR BLD HPLC: 5.6 %
HCT VFR BLD CALC: 47.4 %
HDLC SERPL-MCNC: 41 MG/DL
HGB BLD-MCNC: 15 G/DL
IMM GRANULOCYTES NFR BLD AUTO: 0.2 %
KETONES URINE: NEGATIVE
LDLC SERPL CALC-MCNC: 142 MG/DL
LEUKOCYTE ESTERASE URINE: NEGATIVE
LYMPHOCYTES # BLD AUTO: 3.78 K/UL
LYMPHOCYTES NFR BLD AUTO: 40.5 %
MAN DIFF?: NORMAL
MCHC RBC-ENTMCNC: 27.6 PG
MCHC RBC-ENTMCNC: 31.6 GM/DL
MCV RBC AUTO: 87.3 FL
MONOCYTES # BLD AUTO: 0.65 K/UL
MONOCYTES NFR BLD AUTO: 7 %
NEUTROPHILS # BLD AUTO: 4.2 K/UL
NEUTROPHILS NFR BLD AUTO: 44.9 %
NITRITE URINE: NEGATIVE
PH URINE: 5.5
PLATELET # BLD AUTO: 269 K/UL
POTASSIUM SERPL-SCNC: 4 MMOL/L
PROT SERPL-MCNC: 7.4 G/DL
PROTEIN URINE: NEGATIVE
RBC # BLD: 5.43 M/UL
RBC # FLD: 13.9 %
SODIUM SERPL-SCNC: 139 MMOL/L
SPECIFIC GRAVITY URINE: 1.03
TRIGL SERPL-MCNC: 186 MG/DL
TSH SERPL-ACNC: 2.35 UIU/ML
URATE SERPL-MCNC: 6.3 MG/DL
UROBILINOGEN URINE: NORMAL
WBC # FLD AUTO: 9.34 K/UL

## 2020-10-08 ENCOUNTER — EMERGENCY (EMERGENCY)
Facility: HOSPITAL | Age: 35
LOS: 1 days | Discharge: HOME | End: 2020-10-08
Attending: EMERGENCY MEDICINE
Payer: COMMERCIAL

## 2020-10-08 VITALS
WEIGHT: 207.9 LBS | SYSTOLIC BLOOD PRESSURE: 131 MMHG | OXYGEN SATURATION: 98 % | HEIGHT: 73 IN | DIASTOLIC BLOOD PRESSURE: 88 MMHG | HEART RATE: 79 BPM | TEMPERATURE: 98 F | RESPIRATION RATE: 18 BRPM

## 2020-10-08 VITALS
SYSTOLIC BLOOD PRESSURE: 139 MMHG | RESPIRATION RATE: 18 BRPM | DIASTOLIC BLOOD PRESSURE: 89 MMHG | OXYGEN SATURATION: 98 % | HEART RATE: 69 BPM

## 2020-10-08 LAB
ALBUMIN SERPL ELPH-MCNC: 4.4 G/DL — SIGNIFICANT CHANGE UP (ref 3.3–5.2)
ALP SERPL-CCNC: 79 U/L — SIGNIFICANT CHANGE UP (ref 40–120)
ALT FLD-CCNC: 27 U/L — SIGNIFICANT CHANGE UP
AMPHET UR-MCNC: NEGATIVE — SIGNIFICANT CHANGE UP
ANION GAP SERPL CALC-SCNC: 12 MMOL/L — SIGNIFICANT CHANGE UP (ref 5–17)
APPEARANCE UR: CLEAR — SIGNIFICANT CHANGE UP
APTT BLD: 36.5 SEC — HIGH (ref 27.5–35.5)
AST SERPL-CCNC: 24 U/L — SIGNIFICANT CHANGE UP
BACTERIA # UR AUTO: ABNORMAL
BARBITURATES UR SCN-MCNC: NEGATIVE — SIGNIFICANT CHANGE UP
BASOPHILS # BLD AUTO: 0.07 K/UL — SIGNIFICANT CHANGE UP (ref 0–0.2)
BASOPHILS NFR BLD AUTO: 0.7 % — SIGNIFICANT CHANGE UP (ref 0–2)
BENZODIAZ UR-MCNC: NEGATIVE — SIGNIFICANT CHANGE UP
BILIRUB SERPL-MCNC: 0.3 MG/DL — LOW (ref 0.4–2)
BILIRUB UR-MCNC: NEGATIVE — SIGNIFICANT CHANGE UP
BLD GP AB SCN SERPL QL: SIGNIFICANT CHANGE UP
BUN SERPL-MCNC: 21 MG/DL — HIGH (ref 8–20)
CALCIUM SERPL-MCNC: 9.4 MG/DL — SIGNIFICANT CHANGE UP (ref 8.6–10.2)
CHLORIDE SERPL-SCNC: 104 MMOL/L — SIGNIFICANT CHANGE UP (ref 98–107)
CO2 SERPL-SCNC: 22 MMOL/L — SIGNIFICANT CHANGE UP (ref 22–29)
COCAINE METAB.OTHER UR-MCNC: NEGATIVE — SIGNIFICANT CHANGE UP
COLOR SPEC: YELLOW — SIGNIFICANT CHANGE UP
CREAT SERPL-MCNC: 0.87 MG/DL — SIGNIFICANT CHANGE UP (ref 0.5–1.3)
DIFF PNL FLD: NEGATIVE — SIGNIFICANT CHANGE UP
EOSINOPHIL # BLD AUTO: 0.45 K/UL — SIGNIFICANT CHANGE UP (ref 0–0.5)
EOSINOPHIL NFR BLD AUTO: 4.5 % — SIGNIFICANT CHANGE UP (ref 0–6)
EPI CELLS # UR: SIGNIFICANT CHANGE UP
GLUCOSE SERPL-MCNC: 90 MG/DL — SIGNIFICANT CHANGE UP (ref 70–99)
GLUCOSE UR QL: NEGATIVE MG/DL — SIGNIFICANT CHANGE UP
HCT VFR BLD CALC: 47 % — SIGNIFICANT CHANGE UP (ref 39–50)
HGB BLD-MCNC: 15.2 G/DL — SIGNIFICANT CHANGE UP (ref 13–17)
IMM GRANULOCYTES NFR BLD AUTO: 0.3 % — SIGNIFICANT CHANGE UP (ref 0–1.5)
INR BLD: 1.09 RATIO — SIGNIFICANT CHANGE UP (ref 0.88–1.16)
KETONES UR-MCNC: NEGATIVE — SIGNIFICANT CHANGE UP
LEUKOCYTE ESTERASE UR-ACNC: ABNORMAL
LYMPHOCYTES # BLD AUTO: 3.77 K/UL — HIGH (ref 1–3.3)
LYMPHOCYTES # BLD AUTO: 37.9 % — SIGNIFICANT CHANGE UP (ref 13–44)
MCHC RBC-ENTMCNC: 27.2 PG — SIGNIFICANT CHANGE UP (ref 27–34)
MCHC RBC-ENTMCNC: 32.3 GM/DL — SIGNIFICANT CHANGE UP (ref 32–36)
MCV RBC AUTO: 84.2 FL — SIGNIFICANT CHANGE UP (ref 80–100)
METHADONE UR-MCNC: NEGATIVE — SIGNIFICANT CHANGE UP
MONOCYTES # BLD AUTO: 0.65 K/UL — SIGNIFICANT CHANGE UP (ref 0–0.9)
MONOCYTES NFR BLD AUTO: 6.5 % — SIGNIFICANT CHANGE UP (ref 2–14)
NEUTROPHILS # BLD AUTO: 4.98 K/UL — SIGNIFICANT CHANGE UP (ref 1.8–7.4)
NEUTROPHILS NFR BLD AUTO: 50.1 % — SIGNIFICANT CHANGE UP (ref 43–77)
NITRITE UR-MCNC: NEGATIVE — SIGNIFICANT CHANGE UP
OPIATES UR-MCNC: NEGATIVE — SIGNIFICANT CHANGE UP
PCP SPEC-MCNC: SIGNIFICANT CHANGE UP
PCP UR-MCNC: NEGATIVE — SIGNIFICANT CHANGE UP
PH UR: 5 — SIGNIFICANT CHANGE UP (ref 5–8)
PLATELET # BLD AUTO: 289 K/UL — SIGNIFICANT CHANGE UP (ref 150–400)
POTASSIUM SERPL-MCNC: 4 MMOL/L — SIGNIFICANT CHANGE UP (ref 3.5–5.3)
POTASSIUM SERPL-SCNC: 4 MMOL/L — SIGNIFICANT CHANGE UP (ref 3.5–5.3)
PROT SERPL-MCNC: 7.7 G/DL — SIGNIFICANT CHANGE UP (ref 6.6–8.7)
PROT UR-MCNC: NEGATIVE MG/DL — SIGNIFICANT CHANGE UP
PROTHROM AB SERPL-ACNC: 12.6 SEC — SIGNIFICANT CHANGE UP (ref 10.6–13.6)
RBC # BLD: 5.58 M/UL — SIGNIFICANT CHANGE UP (ref 4.2–5.8)
RBC # FLD: 13.2 % — SIGNIFICANT CHANGE UP (ref 10.3–14.5)
RBC CASTS # UR COMP ASSIST: SIGNIFICANT CHANGE UP /HPF (ref 0–4)
SODIUM SERPL-SCNC: 138 MMOL/L — SIGNIFICANT CHANGE UP (ref 135–145)
SP GR SPEC: 1.02 — SIGNIFICANT CHANGE UP (ref 1.01–1.02)
THC UR QL: POSITIVE
TROPONIN T SERPL-MCNC: <0.01 NG/ML — SIGNIFICANT CHANGE UP (ref 0–0.06)
TROPONIN T SERPL-MCNC: <0.01 NG/ML — SIGNIFICANT CHANGE UP (ref 0–0.06)
UROBILINOGEN FLD QL: NEGATIVE MG/DL — SIGNIFICANT CHANGE UP
WBC # BLD: 9.95 K/UL — SIGNIFICANT CHANGE UP (ref 3.8–10.5)
WBC # FLD AUTO: 9.95 K/UL — SIGNIFICANT CHANGE UP (ref 3.8–10.5)
WBC UR QL: SIGNIFICANT CHANGE UP

## 2020-10-08 PROCEDURE — 86900 BLOOD TYPING SEROLOGIC ABO: CPT

## 2020-10-08 PROCEDURE — 86850 RBC ANTIBODY SCREEN: CPT

## 2020-10-08 PROCEDURE — 93010 ELECTROCARDIOGRAM REPORT: CPT

## 2020-10-08 PROCEDURE — 93005 ELECTROCARDIOGRAM TRACING: CPT

## 2020-10-08 PROCEDURE — 70450 CT HEAD/BRAIN W/O DYE: CPT | Mod: 26

## 2020-10-08 PROCEDURE — 99284 EMERGENCY DEPT VISIT MOD MDM: CPT | Mod: 25

## 2020-10-08 PROCEDURE — 80307 DRUG TEST PRSMV CHEM ANLYZR: CPT

## 2020-10-08 PROCEDURE — 85610 PROTHROMBIN TIME: CPT

## 2020-10-08 PROCEDURE — 82962 GLUCOSE BLOOD TEST: CPT

## 2020-10-08 PROCEDURE — 71045 X-RAY EXAM CHEST 1 VIEW: CPT

## 2020-10-08 PROCEDURE — 71045 X-RAY EXAM CHEST 1 VIEW: CPT | Mod: 26

## 2020-10-08 PROCEDURE — 36415 COLL VENOUS BLD VENIPUNCTURE: CPT

## 2020-10-08 PROCEDURE — 84484 ASSAY OF TROPONIN QUANT: CPT

## 2020-10-08 PROCEDURE — 85025 COMPLETE CBC W/AUTO DIFF WBC: CPT

## 2020-10-08 PROCEDURE — 99285 EMERGENCY DEPT VISIT HI MDM: CPT

## 2020-10-08 PROCEDURE — 80053 COMPREHEN METABOLIC PANEL: CPT

## 2020-10-08 PROCEDURE — 86901 BLOOD TYPING SEROLOGIC RH(D): CPT

## 2020-10-08 PROCEDURE — 70450 CT HEAD/BRAIN W/O DYE: CPT

## 2020-10-08 PROCEDURE — 81001 URINALYSIS AUTO W/SCOPE: CPT

## 2020-10-08 PROCEDURE — 85730 THROMBOPLASTIN TIME PARTIAL: CPT

## 2020-10-08 RX ORDER — ASPIRIN/CALCIUM CARB/MAGNESIUM 324 MG
325 TABLET ORAL ONCE
Refills: 0 | Status: COMPLETED | OUTPATIENT
Start: 2020-10-08 | End: 2020-10-08

## 2020-10-08 RX ADMIN — Medication 325 MILLIGRAM(S): at 18:13

## 2020-10-08 NOTE — ED ADULT TRIAGE NOTE - CHIEF COMPLAINT QUOTE
pt reports left sided arm and leg "heaviness" after waking up yesterday morning and a pain to left chest yesterday. spoke to PMD today and told to come to ED r/o TIA, symptoms no longer present at this time

## 2020-10-08 NOTE — ED PROVIDER NOTE - CRANIAL NERVE AND PUPILLARY EXAM
corneal reflex intact/extra-ocular movements intact/peripheral vision intact/cranial nerves 2-12 intact/tongue is midline

## 2020-10-08 NOTE — ED PROVIDER NOTE - CARE PROVIDER_API CALL
Bethel Sadler  CLINICAL NEUROPHYSIOLOGY  [address] [place] [address]  Phone: [phone]  Fax: [phone]  Follow Up Time: 4-6 Days

## 2020-10-08 NOTE — ED PROVIDER NOTE - PROGRESS NOTE DETAILS
EA: Ct Head, Cxray, EKG, labs unremarkable. VS have improved and patient denies any symptoms at this time. Will have patient follow up with pcp and neurology outpatient for further evaluation. Attending aware and agrees with plan. Discussed with patient, findings, plan of care, follow up, and return precautions. Patient verbalizes understanding and agrees with plan of care. The patient advised to stay for MRI in ED observation but refusing to stay and wants to go home against medical advise.  The patient is alert and oriented x4 and understands the risks of s/o AMA including stroke to death

## 2020-10-08 NOTE — ED ADULT NURSE REASSESSMENT NOTE - NS ED NURSE REASSESS COMMENT FT1
Assumed pt care @3569. Report received from day RN. VSS, breathing even and unlabored. No acute distress present. Awaiting AMA paperwork at this time.

## 2020-10-08 NOTE — ED PROVIDER NOTE - OBJECTIVE STATEMENT
34 y/o male with pmhx hyperhydrosis sent by PMD for evaluation for TIA. Patient states he went to sleep Tuesday night at 2100 and awoke Wednesday morning at 0730 with left arm and leg weakness and numbness of left arm associated with left sided chest pain. His sx were moderate and constant throughout the day and resolved upon waking this morning. He notes smoking marijuana 3-4 times per day to help with his hyperhydrosis and smokes cigarettes daily for the past 20 years. His most recent blood work confirms HLD but is not on any medications at this time. Pt. also notes having lifted a countertop the day prior. At this time he denies any cp, weakness, paresthesias, changes in vision, ha, changes in sensation, sick contacts, fever, or recent illness. Denies any fmhx of cardiac, neuro, or autoimmune disease.

## 2020-10-08 NOTE — ED PROVIDER NOTE - ATTENDING CONTRIBUTION TO CARE
The patient seen and examined    TIA    I, Patrice Sears, performed the initial face to face bedside interview with this patient regarding history of present illness, review of symptoms and relevant past medical, social and family history.  I completed an independent physical examination.  I was the initial provider who evaluated this patient. I have signed out the follow up of any pending tests (i.e. labs, radiological studies) to the ACP.  I have communicated the patient’s plan of care and disposition with the ACP.

## 2020-10-08 NOTE — ED PROVIDER NOTE - CLINICAL SUMMARY MEDICAL DECISION MAKING FREE TEXT BOX
36 y/o male presenting with concerns of TIA c/o left sided heaviness upon waking yesterday with persistent symptoms throughout the day until awaking this morning. Patient is asymptomatic. PE unremarkable. will obtain CT head, labs, ekg.

## 2020-10-08 NOTE — ED PROVIDER NOTE - ENMT, MLM
Airway patent, Nasal mucosa clear. Mouth with normal mucosa. Throat has no vesicles, no oropharyngeal exudates and uvula is midline. Right tonsilar enlarged.

## 2020-10-08 NOTE — ED PROVIDER NOTE - PATIENT PORTAL LINK FT
You can access the FollowMyHealth Patient Portal offered by [facility] by registering at the following website: [email or web address]. By joining [facility]’s FollowMyHealth portal, you will also be able to view your health information using other applications (apps) compatible with our system.

## 2020-10-08 NOTE — ED PROVIDER NOTE - MUSCULOSKELETAL, MLM
Spine appears normal, range of motion is not limited, no muscle or joint tenderness. Full ROM, 5/5 strength in all extremities. left flank ttp.

## 2020-10-08 NOTE — ED PROVIDER NOTE - NSFOLLOWUPINSTRUCTIONS_ED_ALL_ED_FT
FOLLOW UP WITH PRIMARY CARE PROVIDER WITHIN 1-3 DAYS     FOLLOW UP WITH NEUROLOGY WITHIN 1 WEEK     RETURN TO ER SOONER IF YOU DEVELOP ANY WORSENING SYMPTOMS, CHEST PAIN, SHORTNESS OF BREATH, CHANGES IN VISION, WORSENING HEADACHE, WEAKNESS, CHANGES IN SENSATION.

## 2020-10-14 ENCOUNTER — APPOINTMENT (OUTPATIENT)
Dept: FAMILY MEDICINE | Facility: CLINIC | Age: 35
End: 2020-10-14
Payer: COMMERCIAL

## 2020-10-14 VITALS
SYSTOLIC BLOOD PRESSURE: 116 MMHG | WEIGHT: 206 LBS | DIASTOLIC BLOOD PRESSURE: 78 MMHG | RESPIRATION RATE: 16 BRPM | HEIGHT: 73 IN | TEMPERATURE: 98.2 F | BODY MASS INDEX: 27.3 KG/M2 | OXYGEN SATURATION: 99 % | HEART RATE: 69 BPM

## 2020-10-14 VITALS — TEMPERATURE: 97.9 F

## 2020-10-14 PROCEDURE — 99214 OFFICE O/P EST MOD 30 MIN: CPT

## 2020-10-14 NOTE — HISTORY OF PRESENT ILLNESS
[FreeTextEntry1] : follow up [de-identified] : Seen in ER for numbness in the arm and legs left side. Lasted all day. Possible TIA, CT done was negative.\par Smokes Marijuana all day because it helps with Hyperhydrosis.\par \yasmin Had previously similar episode 2018, seen in [facility]. Reports he passed out after a draw. Kept in Hospital for 5 days at that time.\par work up was negative\par Reports he has headaches for many years. Wakes him up from sleep sometimes. No Hx of Migraines. NSAIDs/ Eccedrin does not help. They resolve on their own.

## 2020-10-14 NOTE — PHYSICAL EXAM

## 2020-10-14 NOTE — ASSESSMENT
[FreeTextEntry1] : Numbness/ LEft side\par Upper and Lower extremity\par ? TIA as per ER\par Had advised MRI to the patient.\par Chronic Headaches Hx\par See Neurology\par MRI ordered\par \par THC/ Smokes Marijuane\par Advised to quit\par \par Hyperhidrosis: see Endo\par ? prev w/u was negative\par

## 2020-10-21 ENCOUNTER — APPOINTMENT (OUTPATIENT)
Dept: MRI IMAGING | Facility: CLINIC | Age: 35
End: 2020-10-21
Payer: COMMERCIAL

## 2020-10-21 ENCOUNTER — OUTPATIENT (OUTPATIENT)
Dept: OUTPATIENT SERVICES | Facility: HOSPITAL | Age: 35
LOS: 1 days | End: 2020-10-21
Payer: COMMERCIAL

## 2020-10-21 DIAGNOSIS — R20.0 ANESTHESIA OF SKIN: ICD-10-CM

## 2020-10-21 PROCEDURE — 70553 MRI BRAIN STEM W/O & W/DYE: CPT

## 2020-10-21 PROCEDURE — 70553 MRI BRAIN STEM W/O & W/DYE: CPT | Mod: 26

## 2020-10-21 PROCEDURE — A9585: CPT

## 2020-10-29 ENCOUNTER — APPOINTMENT (OUTPATIENT)
Dept: ENDOCRINOLOGY | Facility: CLINIC | Age: 35
End: 2020-10-29

## 2020-10-29 DIAGNOSIS — N46.9 MALE INFERTILITY, UNSPECIFIED: ICD-10-CM

## 2020-11-05 ENCOUNTER — APPOINTMENT (OUTPATIENT)
Dept: OTOLARYNGOLOGY | Facility: CLINIC | Age: 35
End: 2020-11-05

## 2020-11-23 ENCOUNTER — APPOINTMENT (OUTPATIENT)
Dept: NEUROSURGERY | Facility: CLINIC | Age: 35
End: 2020-11-23
Payer: COMMERCIAL

## 2020-11-23 ENCOUNTER — EMERGENCY (EMERGENCY)
Facility: HOSPITAL | Age: 35
LOS: 0 days | Discharge: HOME | End: 2020-11-23
Attending: EMERGENCY MEDICINE
Payer: COMMERCIAL

## 2020-11-23 VITALS
TEMPERATURE: 97.7 F | HEIGHT: 73 IN | DIASTOLIC BLOOD PRESSURE: 91 MMHG | BODY MASS INDEX: 27.17 KG/M2 | WEIGHT: 205 LBS | HEART RATE: 75 BPM | OXYGEN SATURATION: 99 % | SYSTOLIC BLOOD PRESSURE: 141 MMHG

## 2020-11-23 VITALS
DIASTOLIC BLOOD PRESSURE: 88 MMHG | WEIGHT: 205.03 LBS | SYSTOLIC BLOOD PRESSURE: 125 MMHG | RESPIRATION RATE: 18 BRPM | HEART RATE: 80 BPM | OXYGEN SATURATION: 100 % | HEIGHT: 73 IN | TEMPERATURE: 98 F

## 2020-11-23 VITALS
DIASTOLIC BLOOD PRESSURE: 84 MMHG | HEART RATE: 70 BPM | RESPIRATION RATE: 17 BRPM | TEMPERATURE: 98 F | OXYGEN SATURATION: 99 % | SYSTOLIC BLOOD PRESSURE: 133 MMHG

## 2020-11-23 DIAGNOSIS — R20.0 ANESTHESIA OF SKIN: ICD-10-CM

## 2020-11-23 DIAGNOSIS — F17.200 NICOTINE DEPENDENCE, UNSPECIFIED, UNCOMPLICATED: ICD-10-CM

## 2020-11-23 DIAGNOSIS — G93.5 COMPRESSION OF BRAIN: ICD-10-CM

## 2020-11-23 DIAGNOSIS — G89.29 HEADACHE, UNSPECIFIED: ICD-10-CM

## 2020-11-23 DIAGNOSIS — R55 SYNCOPE AND COLLAPSE: ICD-10-CM

## 2020-11-23 DIAGNOSIS — R20.2 PARESTHESIA OF SKIN: ICD-10-CM

## 2020-11-23 DIAGNOSIS — R51.9 HEADACHE, UNSPECIFIED: ICD-10-CM

## 2020-11-23 LAB
ADD ON TEST-SPECIMEN IN LAB: SIGNIFICANT CHANGE UP
ALBUMIN SERPL ELPH-MCNC: 4.1 G/DL — SIGNIFICANT CHANGE UP (ref 3.3–5)
ALP SERPL-CCNC: 78 U/L — SIGNIFICANT CHANGE UP (ref 40–120)
ALT FLD-CCNC: 60 U/L — SIGNIFICANT CHANGE UP (ref 12–78)
ANION GAP SERPL CALC-SCNC: 5 MMOL/L — SIGNIFICANT CHANGE UP (ref 5–17)
APPEARANCE UR: CLEAR — SIGNIFICANT CHANGE UP
AST SERPL-CCNC: 18 U/L — SIGNIFICANT CHANGE UP (ref 15–37)
BASOPHILS # BLD AUTO: 0.09 K/UL — SIGNIFICANT CHANGE UP (ref 0–0.2)
BASOPHILS NFR BLD AUTO: 0.9 % — SIGNIFICANT CHANGE UP (ref 0–2)
BILIRUB SERPL-MCNC: 0.3 MG/DL — SIGNIFICANT CHANGE UP (ref 0.2–1.2)
BILIRUB UR-MCNC: NEGATIVE — SIGNIFICANT CHANGE UP
BUN SERPL-MCNC: 18 MG/DL — SIGNIFICANT CHANGE UP (ref 7–23)
CALCIUM SERPL-MCNC: 9.6 MG/DL — SIGNIFICANT CHANGE UP (ref 8.5–10.1)
CHLORIDE SERPL-SCNC: 109 MMOL/L — HIGH (ref 96–108)
CO2 SERPL-SCNC: 27 MMOL/L — SIGNIFICANT CHANGE UP (ref 22–31)
COLOR SPEC: YELLOW — SIGNIFICANT CHANGE UP
CREAT SERPL-MCNC: 0.9 MG/DL — SIGNIFICANT CHANGE UP (ref 0.5–1.3)
DIFF PNL FLD: NEGATIVE — SIGNIFICANT CHANGE UP
EOSINOPHIL # BLD AUTO: 0.42 K/UL — SIGNIFICANT CHANGE UP (ref 0–0.5)
EOSINOPHIL NFR BLD AUTO: 4.1 % — SIGNIFICANT CHANGE UP (ref 0–6)
GLUCOSE SERPL-MCNC: 92 MG/DL — SIGNIFICANT CHANGE UP (ref 70–99)
GLUCOSE UR QL: NEGATIVE MG/DL — SIGNIFICANT CHANGE UP
HCT VFR BLD CALC: 47.5 % — SIGNIFICANT CHANGE UP (ref 39–50)
HGB BLD-MCNC: 15.3 G/DL — SIGNIFICANT CHANGE UP (ref 13–17)
IMM GRANULOCYTES NFR BLD AUTO: 0.4 % — SIGNIFICANT CHANGE UP (ref 0–1.5)
KETONES UR-MCNC: NEGATIVE — SIGNIFICANT CHANGE UP
LEUKOCYTE ESTERASE UR-ACNC: NEGATIVE — SIGNIFICANT CHANGE UP
LYMPHOCYTES # BLD AUTO: 2.94 K/UL — SIGNIFICANT CHANGE UP (ref 1–3.3)
LYMPHOCYTES # BLD AUTO: 28.6 % — SIGNIFICANT CHANGE UP (ref 13–44)
MAGNESIUM SERPL-MCNC: 2.2 MG/DL — SIGNIFICANT CHANGE UP (ref 1.6–2.6)
MCHC RBC-ENTMCNC: 27.2 PG — SIGNIFICANT CHANGE UP (ref 27–34)
MCHC RBC-ENTMCNC: 32.2 GM/DL — SIGNIFICANT CHANGE UP (ref 32–36)
MCV RBC AUTO: 84.4 FL — SIGNIFICANT CHANGE UP (ref 80–100)
MONOCYTES # BLD AUTO: 0.72 K/UL — SIGNIFICANT CHANGE UP (ref 0–0.9)
MONOCYTES NFR BLD AUTO: 7 % — SIGNIFICANT CHANGE UP (ref 2–14)
NEUTROPHILS # BLD AUTO: 6.08 K/UL — SIGNIFICANT CHANGE UP (ref 1.8–7.4)
NEUTROPHILS NFR BLD AUTO: 59 % — SIGNIFICANT CHANGE UP (ref 43–77)
NITRITE UR-MCNC: NEGATIVE — SIGNIFICANT CHANGE UP
PH UR: 5 — SIGNIFICANT CHANGE UP (ref 5–8)
PHOSPHATE SERPL-MCNC: 3.2 MG/DL — SIGNIFICANT CHANGE UP (ref 2.5–4.5)
PLATELET # BLD AUTO: 278 K/UL — SIGNIFICANT CHANGE UP (ref 150–400)
POTASSIUM SERPL-MCNC: 3.8 MMOL/L — SIGNIFICANT CHANGE UP (ref 3.5–5.3)
POTASSIUM SERPL-SCNC: 3.8 MMOL/L — SIGNIFICANT CHANGE UP (ref 3.5–5.3)
PROT SERPL-MCNC: 8.3 GM/DL — SIGNIFICANT CHANGE UP (ref 6–8.3)
PROT UR-MCNC: NEGATIVE MG/DL — SIGNIFICANT CHANGE UP
RBC # BLD: 5.63 M/UL — SIGNIFICANT CHANGE UP (ref 4.2–5.8)
RBC # FLD: 14 % — SIGNIFICANT CHANGE UP (ref 10.3–14.5)
SODIUM SERPL-SCNC: 141 MMOL/L — SIGNIFICANT CHANGE UP (ref 135–145)
SP GR SPEC: 1.01 — SIGNIFICANT CHANGE UP (ref 1.01–1.02)
TROPONIN I SERPL-MCNC: <0.015 NG/ML — SIGNIFICANT CHANGE UP (ref 0.01–0.04)
UROBILINOGEN FLD QL: NEGATIVE MG/DL — SIGNIFICANT CHANGE UP
WBC # BLD: 10.29 K/UL — SIGNIFICANT CHANGE UP (ref 3.8–10.5)
WBC # FLD AUTO: 10.29 K/UL — SIGNIFICANT CHANGE UP (ref 3.8–10.5)

## 2020-11-23 PROCEDURE — 82962 GLUCOSE BLOOD TEST: CPT

## 2020-11-23 PROCEDURE — 99203 OFFICE O/P NEW LOW 30 MIN: CPT

## 2020-11-23 PROCEDURE — 84484 ASSAY OF TROPONIN QUANT: CPT

## 2020-11-23 PROCEDURE — 93010 ELECTROCARDIOGRAM REPORT: CPT

## 2020-11-23 PROCEDURE — 81003 URINALYSIS AUTO W/O SCOPE: CPT

## 2020-11-23 PROCEDURE — 80053 COMPREHEN METABOLIC PANEL: CPT

## 2020-11-23 PROCEDURE — 95819 EEG AWAKE AND ASLEEP: CPT

## 2020-11-23 PROCEDURE — 72148 MRI LUMBAR SPINE W/O DYE: CPT

## 2020-11-23 PROCEDURE — 95819 EEG AWAKE AND ASLEEP: CPT | Mod: 26

## 2020-11-23 PROCEDURE — 72141 MRI NECK SPINE W/O DYE: CPT

## 2020-11-23 PROCEDURE — 85025 COMPLETE CBC W/AUTO DIFF WBC: CPT

## 2020-11-23 PROCEDURE — 93005 ELECTROCARDIOGRAM TRACING: CPT

## 2020-11-23 PROCEDURE — 99285 EMERGENCY DEPT VISIT HI MDM: CPT

## 2020-11-23 PROCEDURE — 36415 COLL VENOUS BLD VENIPUNCTURE: CPT

## 2020-11-23 PROCEDURE — 72148 MRI LUMBAR SPINE W/O DYE: CPT | Mod: 26

## 2020-11-23 PROCEDURE — 99282 EMERGENCY DEPT VISIT SF MDM: CPT

## 2020-11-23 PROCEDURE — 72141 MRI NECK SPINE W/O DYE: CPT | Mod: 26

## 2020-11-23 PROCEDURE — 99284 EMERGENCY DEPT VISIT MOD MDM: CPT | Mod: 25

## 2020-11-23 PROCEDURE — 84100 ASSAY OF PHOSPHORUS: CPT

## 2020-11-23 PROCEDURE — 83735 ASSAY OF MAGNESIUM: CPT

## 2020-11-23 RX ORDER — OMEPRAZOLE 40 MG/1
40 CAPSULE, DELAYED RELEASE ORAL
Qty: 30 | Refills: 5 | Status: DISCONTINUED | COMMUNITY
Start: 2019-07-25 | End: 2020-11-23

## 2020-11-23 RX ORDER — CYCLOBENZAPRINE HYDROCHLORIDE 5 MG/1
5 TABLET, FILM COATED ORAL 3 TIMES DAILY
Qty: 21 | Refills: 0 | Status: DISCONTINUED | COMMUNITY
Start: 2020-01-07 | End: 2020-11-23

## 2020-11-23 RX ORDER — IMIQUIMOD 50 MG/G
5 CREAM TOPICAL
Qty: 24 | Refills: 0 | Status: DISCONTINUED | COMMUNITY
Start: 2019-10-15 | End: 2020-11-23

## 2020-11-23 RX ORDER — SODIUM CHLORIDE 9 MG/ML
1000 INJECTION INTRAMUSCULAR; INTRAVENOUS; SUBCUTANEOUS ONCE
Refills: 0 | Status: COMPLETED | OUTPATIENT
Start: 2020-11-23 | End: 2020-11-23

## 2020-11-23 RX ORDER — ERGOCALCIFEROL 1.25 MG/1
1.25 MG CAPSULE, LIQUID FILLED ORAL
Qty: 12 | Refills: 0 | Status: DISCONTINUED | COMMUNITY
Start: 2019-03-28 | End: 2020-11-23

## 2020-11-23 RX ORDER — SUMATRIPTAN 100 MG/1
100 TABLET, FILM COATED ORAL
Qty: 10 | Refills: 0 | Status: DISCONTINUED | COMMUNITY
Start: 2020-01-07 | End: 2020-11-23

## 2020-11-23 RX ADMIN — SODIUM CHLORIDE 2000 MILLILITER(S): 9 INJECTION INTRAMUSCULAR; INTRAVENOUS; SUBCUTANEOUS at 11:25

## 2020-11-23 NOTE — ED PROVIDER NOTE - CARE PROVIDER_API CALL
Canelo Jha  NEUROSURGERY  [address] [facility] [address] [address] [place] [address]  Phone: [phone]  Fax: [phone]  Follow Up Time:    Canelo Jha  NEUROSURGERY  [address] [facility] [address] [place] [address]  Phone: [phone]  Fax: [phone]  Follow Up Time:     Giorgio Horvath)  Cardiovascular Disease; Internal Medicine; Nuclear Cardiology  [address], [address]  [place] [address]  Phone: [phone]  Fax: [phone]  Follow Up Time:

## 2020-11-23 NOTE — CONSULT NOTE ADULT - SUBJECTIVE AND OBJECTIVE BOX
Patient is a 35y old  Male who presents with a chief complaint of     HPI:  34 yo male with PMH hyperhydrosis was recently found to have a Chiari malformation on outpt work up for an episode of transient paresthesias While in Dr. Jha's office today discussing CT findings and treatment options pt had a syncopal episode vs a seizure.   Per Dr. Jha pt's eye rolled backwards to the right and his manpreet UE became tonic which resolved spontaneously and pt was found to be diaphoretic Pt denies any headache, dizziness, visual changes, numbness tingling, weakness, b/b incontinence, cp, sob, palpitations.   Will obtain spot EEG, MRI c and L spine and a syncopal w/u in the ER. If work-up is negative pt may go home.         PAST MEDICAL & SURGICAL HISTORY:  No pertinent past medical history    No significant past surgical history        FAMILY HISTORY:      Social Hx:  + smoker        Allergies    No Known Allergies    Intolerances        ROS: Pertinent positives in HPI, all other ROS were reviewed and are negative.      Vital Signs Last 24 Hrs  T(C): 36.9 (23 Nov 2020 10:49), Max: 36.9 (23 Nov 2020 10:49)  T(F): 98.5 (23 Nov 2020 10:49), Max: 98.5 (23 Nov 2020 10:49)  HR: 80 (23 Nov 2020 10:49) (80 - 80)  BP: 125/88 (23 Nov 2020 10:49) (125/88 - 125/88)  BP(mean): --  RR: 18 (23 Nov 2020 10:49) (18 - 18)  SpO2: 100% (23 Nov 2020 10:49) (100% - 100%)            Neurological exam:  HF: A x O x 3. Appropriately interactive, normal affect. Speech fluent, No Aphasia or paraphasic errors  CN: HUSSAIN, EOMI, VFF, facial sensation normal, no NLFD, tongue midline, Palate moves equally  Motor: No pronator drift, Strength 5/5 in all 4 ext, normal bulk and tone, no tremor, rigidity or bradykinesia.    Sens: Intact to light touch  Coord:  No FNFA, dysmetria, AAYUSH intact   Gait/Balance: not tested

## 2020-11-23 NOTE — REASON FOR VISIT
[New Patient Visit] : a new patient visit [FreeTextEntry1] : chiari malformation with headache - vision blurring

## 2020-11-23 NOTE — ED PROVIDER NOTE - PROGRESS NOTE DETAILS
s/o received from  pending EEG, NUS consult and second trop. EEG and second trop normal. d/w NUS ERIN Freeman. She will d/w  and complete their consult with dispo recommendation. likely DC home. MD GUSTAVO Ok to dc from NUS persepctive, per SUJIT Freeman. Pt will f/u with both  for further NUS care  and cardio regarding his syncope. MD GUSTAVO

## 2020-11-23 NOTE — ED PROVIDER NOTE - OBJECTIVE STATEMENT
36 y/o male with PMHx of Hyperhidrosis presents to the ED BIBEMS from MD's Dr. Jha's office for evaluation of syncope vs seizure. Pt had an incidental finding of recent MRI brain and was speaking with MD today about a procedure to be possibly done when his eyes rolled back and lost consciousness, witnessed by MD. Also states that he did not eat anything this morning. Pt sent for EEG and MRI to be done in ED. No diarrhea, vomiting, fever. No recent travel. Current smoker.

## 2020-11-23 NOTE — ED PROVIDER NOTE - NSFOLLOWUPINSTRUCTIONS_ED_ALL_ED_FT
Syncope    WHAT YOU NEED TO KNOW:    Syncope is also called fainting or passing out. Syncope is a sudden, temporary loss of consciousness, followed by a fall from a standing or sitting position. Syncope ranges from not serious to a sign of a more serious condition that needs to be treated. You can control some health conditions that cause syncope. Your healthcare providers can help you create a plan to manage syncope and prevent episodes.    DISCHARGE INSTRUCTIONS:    Seek care immediately if:     You are bleeding because you hit your head when you fainted.       You suddenly have double vision, difficulty speaking, numbness, and cannot move your arms or legs.      You have chest pain and trouble breathing.      You vomit blood or material that looks like coffee grounds.      You see blood in your bowel movement.    Contact your healthcare provider if:     You have new or worsening symptoms.      You have another syncope episode.      You have a headache, fast heartbeat, or feel too dizzy to stand up.      You have questions or concerns about your condition or care.    Medicines:     Medicines may be needed to help your heart pump strongly and regularly. Your healthcare provider may also make changes to any medicines that are causing syncope.       Take your medicine as directed. Contact your healthcare provider if you think your medicine is not helping or if you have side effects. Tell him or her if you are allergic to any medicine. Keep a list of the medicines, vitamins, and herbs you take. Include the amounts, and when and why you take them. Bring the list or the pill bottles to follow-up visits. Carry your medicine list with you in case of an emergency.    Follow up with your healthcare provider as directed: Write down your questions so you remember to ask them during your visits.     Manage syncope:     Keep a record of your syncope episodes. Include your symptoms and your activity before and after the episode. The record can help your healthcare provider find the cause of your syncope and help you manage episodes.      Sit or lie down when needed. This includes when you feel dizzy, your throat is getting tight, and your vision changes. Raise your legs above the level of your heart.      Take slow, deep breaths if you start to breathe faster with anxiety or fear. This can help decrease dizziness and the feeling that you might faint.       Check your blood pressure often. This is important if you take medicine to lower your blood pressure. Check your blood pressure when you are lying down and when you are standing. Ask how often to check during the day. Keep a record of your blood pressure numbers. Your healthcare provider may use the record to help plan your treatment.How to take a Blood Pressure         Prevent a syncope episode:     Move slowly and let yourself get used to one position before you move to another position. This is very important when you change from a lying or sitting position to a standing position. Take some deep breaths before you stand up from a lying position. Stand up slowly. Sudden movements may cause a fainting spell. Sit on the side of the bed or couch for a few minutes before you stand up. If you are on bedrest, try to be upright for about 2 hours each day, or as directed. Do not lock your legs if you are standing for a long period of time. Move your legs and bend your knees to keep blood flowing.      Follow your healthcare provider's recommendations. Your provider may recommend that you drink more liquids to prevent dehydration. You may also need to have more salt to keep your blood pressure from dropping too low and causing syncope. Your provider will tell you how much liquid and sodium to have each day. He or she will also tell you how much physical activity is safe for you. This will depend on what is causing your syncope.      Watch for signs of low blood sugar. These include hunger, nervousness, sweating, and fast or fluttery heartbeats. Talk with your healthcare provider about ways to keep your blood sugar level steady.      Do not strain if you are constipated. You may faint if you strain to have a bowel movement. Walking is the best way to get your bowels moving. Eat foods high in fiber to make it easier to have a bowel movement. Good examples are high-fiber cereals, beans, vegetables, and whole-grain breads. Prune juice may help make bowel movements softer.      Be careful in hot weather. Heat can cause a syncope episode. Limit activity done outside on hot days. Physical activity in hot weather can lead to dehydration. This can cause an episode. Follow up with neurosurgery  and cardiology .     Syncope    WHAT YOU NEED TO KNOW:    Syncope is also called fainting or passing out. Syncope is a sudden, temporary loss of consciousness, followed by a fall from a standing or sitting position. Syncope ranges from not serious to a sign of a more serious condition that needs to be treated. You can control some health conditions that cause syncope. Your healthcare providers can help you create a plan to manage syncope and prevent episodes.    DISCHARGE INSTRUCTIONS:    Seek care immediately if:     You are bleeding because you hit your head when you fainted.       You suddenly have double vision, difficulty speaking, numbness, and cannot move your arms or legs.      You have chest pain and trouble breathing.      You vomit blood or material that looks like coffee grounds.      You see blood in your bowel movement.    Contact your healthcare provider if:     You have new or worsening symptoms.      You have another syncope episode.      You have a headache, fast heartbeat, or feel too dizzy to stand up.      You have questions or concerns about your condition or care.    Medicines:     Medicines may be needed to help your heart pump strongly and regularly. Your healthcare provider may also make changes to any medicines that are causing syncope.       Take your medicine as directed. Contact your healthcare provider if you think your medicine is not helping or if you have side effects. Tell him or her if you are allergic to any medicine. Keep a list of the medicines, vitamins, and herbs you take. Include the amounts, and when and why you take them. Bring the list or the pill bottles to follow-up visits. Carry your medicine list with you in case of an emergency.    Follow up with your healthcare provider as directed: Write down your questions so you remember to ask them during your visits.     Manage syncope:     Keep a record of your syncope episodes. Include your symptoms and your activity before and after the episode. The record can help your healthcare provider find the cause of your syncope and help you manage episodes.      Sit or lie down when needed. This includes when you feel dizzy, your throat is getting tight, and your vision changes. Raise your legs above the level of your heart.      Take slow, deep breaths if you start to breathe faster with anxiety or fear. This can help decrease dizziness and the feeling that you might faint.       Check your blood pressure often. This is important if you take medicine to lower your blood pressure. Check your blood pressure when you are lying down and when you are standing. Ask how often to check during the day. Keep a record of your blood pressure numbers. Your healthcare provider may use the record to help plan your treatment.How to take a Blood Pressure         Prevent a syncope episode:     Move slowly and let yourself get used to one position before you move to another position. This is very important when you change from a lying or sitting position to a standing position. Take some deep breaths before you stand up from a lying position. Stand up slowly. Sudden movements may cause a fainting spell. Sit on the side of the bed or couch for a few minutes before you stand up. If you are on bedrest, try to be upright for about 2 hours each day, or as directed. Do not lock your legs if you are standing for a long period of time. Move your legs and bend your knees to keep blood flowing.      Follow your healthcare provider's recommendations. Your provider may recommend that you drink more liquids to prevent dehydration. You may also need to have more salt to keep your blood pressure from dropping too low and causing syncope. Your provider will tell you how much liquid and sodium to have each day. He or she will also tell you how much physical activity is safe for you. This will depend on what is causing your syncope.      Watch for signs of low blood sugar. These include hunger, nervousness, sweating, and fast or fluttery heartbeats. Talk with your healthcare provider about ways to keep your blood sugar level steady.      Do not strain if you are constipated. You may faint if you strain to have a bowel movement. Walking is the best way to get your bowels moving. Eat foods high in fiber to make it easier to have a bowel movement. Good examples are high-fiber cereals, beans, vegetables, and whole-grain breads. Prune juice may help make bowel movements softer.      Be careful in hot weather. Heat can cause a syncope episode. Limit activity done outside on hot days. Physical activity in hot weather can lead to dehydration. This can cause an episode.

## 2020-11-23 NOTE — ED PROVIDER NOTE - PATIENT PORTAL LINK FT
You can access the FollowMyHealth Patient Portal offered by [facility] by registering at the following website: [email or web address]. By joining [employer]’s FollowMyHealth portal, you will also be able to view your health information using other applications (apps) compatible with our system.

## 2020-11-23 NOTE — ED PROVIDER NOTE - CARE PROVIDERS DIRECT ADDRESSES
,[email or web address]

## 2020-11-23 NOTE — CONSULT NOTE ADULT - ASSESSMENT
36 yo male with PMH hyperhydrosis was recently found to have a Chiari malformation on outpt work up for an episode of transient paresthesias While in Dr. Jha's office today discussing CT findings and treatment options pt had a syncopal episode vs a seizure.   Per Dr. Jha pt's eye rolled backwards to the right and his manpreet UE became tonic which resolved spontaneously and pt was found to be diaphoretic Pt denies any headache, dizziness, visual changes, numbness tingling, weakness, b/b incontinence, cp, sob, palpitations.   Will obtain spot EEG, MRI c and L spine and a syncopal w/u in the ER. If work-up is negative pt may go home. 34 yo male with PMH hyperhydrosis was recently found to have a Chiari malformation on outpt work up for an episode of transient paresthesias While in Dr. Jha's office today discussing CT findings and treatment options pt had a syncopal episode vs a seizure.   Per Dr. Jha pt's eye rolled backwards to the right and his manpreet UE became tonic which resolved spontaneously and pt was found to be diaphoretic Pt denies any headache, dizziness, visual changes, numbness tingling, weakness, b/b incontinence, cp, sob, palpitations.   Will obtain spot EEG, MRI c and L spine and a syncopal w/u in the ER. If work-up is negative pt may go home.       PA follow up-  EEG negative for seizure  MRI negative for tethered cord or syrinx  No surgical intervention at this time   Follow up as scheduled with Dr. Jha   Recommend cardiology follow up as an outpatient

## 2020-11-23 NOTE — ED ADULT NURSE REASSESSMENT NOTE - NS ED NURSE REASSESS COMMENT FT1
pt dc to home. dc instructions given for syncope. pt to f/u with neuro and pcp. given info of cardio for f/u as well. pt verbalized understanding of dc. pt ambulated out of ED. denies dizziness/lightheadedness. AOx4, in no acute distress at time of dc.

## 2020-11-23 NOTE — CONSULT LETTER
[Dear  ___] : Dear  [unfilled], [Courtesy Letter:] : I had the pleasure of seeing your patient, [unfilled], in my office today. [Sincerely,] : Sincerely, [FreeTextEntry2] : Abdon Ku MD\par [address] [facility] [address] [address] [FreeTextEntry1] : This very pleasant right-handed 35-year-old male presents with over 1 year of progressive problems with headaches with some balance issues and changes in vision when his headaches are intense.  The patient works as a  and he just recently had his first child born on 11 November 2020.  The patient also has a significant history of hyperhidrosis which has been present since early adolescence.  The patient does use some marijuana to provide few hours of relief from the profuse sweating.  The patient has been evaluated by ears nose and throat specialist for asymmetry of his tonsils and some right sided tonsillar pain in particular.\par \par The patient was seen in [place] emergency room for intense headache episodes recently as well as numbness and tingling involving the left arm and left leg with balance issues.  A CT scan then followed by MRI imaging has identified the Chiari malformation.\par \par I have reviewed with the patient in detail his MRI scan of the brain and I have clearly pointed out the downward herniation of the cerebellar tonsils and the burleson magnum.  There is very little space anterior to the brainstem and most certainly then he is experiencing pressure waves which are correlating with his headaches, balance issues, and episodes of visual blurring especially involving the right eye.\par \par   And also indicates that over approximately 6 months or more he has been having difficulties with controlling his bladder and will have times where he has difficulties initiating urination.  He denies any sciatica type pain.  He did have a distant fall landing on his tailbone and lower lumbar area.\par \par During our examination and discussion the patient became lightheaded and unresponsive.  His eyes deviated upward and to the right-hand side and his head rolled back.  The patient became unconscious and his arms extended in a tonic manner for approximately 30 seconds before resolving.  It took approximately an additional 30 seconds until the patient was confused but aware of his surroundings once again.  There is no evidence of incontinence.  The patient was extremely diaphoretic.  The patient's hyperhidrosis was quite profuse involving both hands at that time.  Blood pressure and heart rate were within normal parameters at that time after the episode.  Oxygen saturation was 98% on room air.  The patient denied any specific headache and that his vision was normal.  EMS was called and the patient was transitioned to [facility] for further evaluation of syncope versus seizure episode.\par \par Overall I believe that the patient has a symptomatic Chiari malformation.  Further investigation of an MRI scan of the cervical spine to make sure that there is no developing syrinx which may be contributing to his sympathetic hyperhidrosis as well as his new and progressive symptoms of numbness and tingling in the upper extremities especially the left and the left leg.  Furthermore, a MRI scan of the lumbar spine is requested to rule out tethering of the spinal cord which may be the underlying cause of the downward tonsil herniation.  This may have implications for his bladder control changes.  I will see the patient again once these further tests have been done in order to determine the role for surgical intervention.  Prior to the patient having altered level of consciousness I did discuss in general terms the surgical approach of a posterior fossa decompression with duraplasty to resolve the constriction of the tonsils and to facilitate cerebrospinal fluid flow at the burleson magnum.\par \par Thank you for very kindly including me in the evaluation and treatment of your patient.  Please do not hesitate to contact me should you have any questions or concerns regarding this evaluation, the patient's diagnosis of a Chiari malformation, or the request for additional diagnostic testing. [FreeTextEntry3] : Canelo Jha MD, PhD, FRCPSC \par Attending Neurosurgeon \par  of Neurosurgery \par [facility] \par [address] \[place] [address] \par Office: [phone] \par Fax: [phone]\par \par

## 2020-11-23 NOTE — ED ADULT TRIAGE NOTE - CHIEF COMPLAINT QUOTE
Pt brought in by ambulance from MD office. Pt was speaking with MD about procedure to be done when his eyes rolled back. as per EMS, MD states episode lasted approx 25 seconds. Pt A&Ox4.

## 2020-11-23 NOTE — ED ADULT NURSE NOTE - NSIMPLEMENTINTERV_GEN_ALL_ED
Implemented All Universal Safety Interventions:  [place] to call system. Call bell, personal items and telephone within reach. Instruct patient to call for assistance. Room bathroom lighting operational. Non-slip footwear when patient is off stretcher. Physically safe environment: no spills, clutter or unnecessary equipment. Stretcher in lowest position, wheels locked, appropriate side rails in place.

## 2020-11-23 NOTE — ED ADULT NURSE NOTE - OBJECTIVE STATEMENT
pt presents to ED from MD Jha's office with complaints of his eyes rolling back while there for a procedure. pt was sent in by MD Jha for syncope vs seizure. 20 g placed to left AC. labs sent. EKG performed. Tele and VS monitoring initiated.

## 2020-11-23 NOTE — ED PROVIDER NOTE - PROVIDER TOKENS
PROVIDER:[TOKEN:[[id number]]] PROVIDER:[TOKEN:[[id number]]],PROVIDER:[TOKEN:[[id number]]]

## 2020-11-23 NOTE — ED STATDOCS - PROGRESS NOTE DETAILS
Roslyn LANDEROS for ED attending, Dr. Porter: 36 y/o male with PMHx of hyperhydrosis presents to the ED BIBEMS from MD's Dr. Jha's office for evaluation of syncope vs seizure. Pt was about to have a procedure done when his eyes rolled back into his head, episode lasting approximately 25 seconds. Also states that he did not eat anything this morning. Pt sent for EEG and MRI to be done in ED. Current smoker. Will send pt to main ED for further evaluation and EEG, ordered by Dr. Jha's PA who spoke with pt upon arrival.

## 2020-12-18 ENCOUNTER — APPOINTMENT (OUTPATIENT)
Dept: NEUROSURGERY | Facility: CLINIC | Age: 35
End: 2020-12-18

## 2021-01-08 ENCOUNTER — APPOINTMENT (OUTPATIENT)
Dept: NEUROSURGERY | Facility: CLINIC | Age: 36
End: 2021-01-08
Payer: COMMERCIAL

## 2021-01-08 VITALS
HEART RATE: 80 BPM | WEIGHT: 205 LBS | RESPIRATION RATE: 17 BRPM | OXYGEN SATURATION: 95 % | TEMPERATURE: 98.7 F | SYSTOLIC BLOOD PRESSURE: 138 MMHG | DIASTOLIC BLOOD PRESSURE: 88 MMHG | BODY MASS INDEX: 27.17 KG/M2 | HEIGHT: 73 IN

## 2021-01-08 PROCEDURE — 99072 ADDL SUPL MATRL&STAF TM PHE: CPT

## 2021-01-08 PROCEDURE — 99215 OFFICE O/P EST HI 40 MIN: CPT

## 2021-01-08 NOTE — REASON FOR VISIT
[New Patient Visit] : a new patient visit [Referred By: _________] : Patient was referred by EVY [Other: _____] : [unfilled]

## 2021-01-10 NOTE — PHYSICAL EXAM
[General Appearance - Alert] : alert [General Appearance - In No Acute Distress] : in no acute distress [General Appearance - Well Nourished] : well nourished [General Appearance - Well Developed] : well developed [General Appearance - Well-Appearing] : healthy appearing [Impaired Insight] : insight and judgment were intact [Oriented To Time, Place, And Person] : oriented to person, place, and time [Affect] : the affect was normal [Memory Recent] : recent memory was not impaired [Person] : oriented to person [Place] : oriented to place [Time] : oriented to time [Cranial Nerves Optic (II)] : visual acuity intact bilaterally,  pupils equal round and reactive to light [Cranial Nerves Trigeminal (V)] : facial sensation intact symmetrically [Cranial Nerves Oculomotor (III)] : extraocular motion intact [Cranial Nerves Facial (VII)] : face symmetrical [Cranial Nerves Glossopharyngeal (IX)] : tongue and palate midline [Cranial Nerves Vestibulocochlear (VIII)] : hearing was intact bilaterally [Cranial Nerves Accessory (XI - Cranial And Spinal)] : head turning and shoulder shrug symmetric [Cranial Nerves Hypoglossal (XII)] : there was no tongue deviation with protrusion [Sclera] : the sclera and conjunctiva were normal [PERRL With Normal Accommodation] : pupils were equal in size, round, reactive to light, with normal accommodation [Extraocular Movements] : extraocular movements were intact [Outer Ear] : the ears and nose were normal in appearance [Hearing Threshold Finger Rub Not [place]] : hearing was normal [Neck Appearance] : the appearance of the neck was normal [Oropharynx] : the oropharynx was normal [Respiration, Rhythm And Depth] : normal respiratory rhythm and effort [Abnormal Walk] : normal gait [Exaggerated Use Of Accessory Muscles For Inspiration] : no accessory muscle use [Involuntary Movements] : no involuntary movements were seen [Skin Color & Pigmentation] : normal skin color and pigmentation [Motor Tone] : muscle strength and tone were normal [] : no rash

## 2021-01-11 ENCOUNTER — APPOINTMENT (OUTPATIENT)
Dept: CARDIOLOGY | Facility: CLINIC | Age: 36
End: 2021-01-11
Payer: COMMERCIAL

## 2021-01-11 ENCOUNTER — NON-APPOINTMENT (OUTPATIENT)
Age: 36
End: 2021-01-11

## 2021-01-11 VITALS
DIASTOLIC BLOOD PRESSURE: 80 MMHG | BODY MASS INDEX: 27.17 KG/M2 | HEART RATE: 114 BPM | OXYGEN SATURATION: 98 % | WEIGHT: 205 LBS | SYSTOLIC BLOOD PRESSURE: 152 MMHG | HEIGHT: 73 IN

## 2021-01-11 DIAGNOSIS — Z13.220 ENCOUNTER FOR SCREENING FOR LIPOID DISORDERS: ICD-10-CM

## 2021-01-11 PROCEDURE — 99072 ADDL SUPL MATRL&STAF TM PHE: CPT

## 2021-01-11 PROCEDURE — 99204 OFFICE O/P NEW MOD 45 MIN: CPT

## 2021-01-11 PROCEDURE — 93000 ELECTROCARDIOGRAM COMPLETE: CPT

## 2021-01-11 NOTE — HISTORY OF PRESENT ILLNESS
[FreeTextEntry1] : 34 yo male presents for evaluation of a syncopal episode which occurred while being examined by Dr. Jha  for Chiari. Pt was listening to Dr. Jha explain remedial surgery. Pt reports that he has had these episodes before when he was exposed to blood and violence. Pt has hyperhidrosis and self medicates with marijuana. He has no palpitations, chest pain or shortness of breath. He denies history of heart disease or exertional symptoms.

## 2021-01-11 NOTE — DISCUSSION/SUMMARY
[Vasovagal Syncope] : vasovagal syncope [Stable] : stable [FreeTextEntry1] : Pt will have an Echo and 24 hour monitor. Symptoms are consistent with vaso vagal syncope.

## 2021-01-11 NOTE — PHYSICAL EXAM
[General Appearance - Well Developed] : well developed [Normal Appearance] : normal appearance [Well Groomed] : well groomed [General Appearance - Well Nourished] : well nourished [No Deformities] : no deformities [General Appearance - In No Acute Distress] : no acute distress [Normal Conjunctiva] : the conjunctiva exhibited no abnormalities [Eyelids - No Xanthelasma] : the eyelids demonstrated no xanthelasmas [Normal Oral Mucosa] : normal oral mucosa [No Oral Pallor] : no oral pallor [No Oral Cyanosis] : no oral cyanosis [Normal Jugular Venous A Waves Present] : normal jugular venous A waves present [Normal Jugular Venous V Waves Present] : normal jugular venous V waves present [No Jugular Venous Islas A Waves] : no jugular venous islas A waves [Heart Rate And Rhythm] : heart rate and rhythm were normal [Heart Sounds] : normal S1 and S2 [Murmurs] : no murmurs present [Respiration, Rhythm And Depth] : normal respiratory rhythm and effort [Exaggerated Use Of Accessory Muscles For Inspiration] : no accessory muscle use [Auscultation Breath Sounds / Voice Sounds] : lungs were clear to auscultation bilaterally [Abdomen Soft] : soft [Abdomen Tenderness] : non-tender [Abdomen Mass (___ Cm)] : no abdominal mass palpated [Abnormal Walk] : normal gait [Gait - Sufficient For Exercise Testing] : the gait was sufficient for exercise testing [Nail Clubbing] : no clubbing of the fingernails [Cyanosis, Localized] : no localized cyanosis [Petechial Hemorrhages (___cm)] : no petechial hemorrhages [Skin Color & Pigmentation] : normal skin color and pigmentation [] : no rash [No Venous Stasis] : no venous stasis [Skin Lesions] : no skin lesions [No Skin Ulcers] : no skin ulcer [No Xanthoma] : no  xanthoma was observed [Oriented To Time, Place, And Person] : oriented to person, place, and time [Affect] : the affect was normal [Mood] : the mood was normal [No Anxiety] : not feeling anxious

## 2021-01-15 ENCOUNTER — APPOINTMENT (OUTPATIENT)
Dept: NEUROSURGERY | Facility: CLINIC | Age: 36
End: 2021-01-15

## 2021-01-19 ENCOUNTER — APPOINTMENT (OUTPATIENT)
Dept: NEUROSURGERY | Facility: CLINIC | Age: 36
End: 2021-01-19
Payer: COMMERCIAL

## 2021-01-19 PROCEDURE — 99203 OFFICE O/P NEW LOW 30 MIN: CPT | Mod: 95

## 2021-01-19 NOTE — REASON FOR VISIT
[New Patient Visit] : a new patient visit [Referred By: _________] : Patient was referred by EVY [FreeTextEntry1] : Hyperhidrosis

## 2021-01-19 NOTE — PHYSICAL EXAM
[Oriented To Time, Place, And Person] : oriented to person, place, and time [Impaired Insight] : insight and judgment were intact [Affect] : the affect was normal [Memory Recent] : recent memory was not impaired [Person] : oriented to person [Place] : oriented to place [Time] : oriented to time [Short Term Intact] : short term memory intact [Cranial Nerves Optic (II)] : visual acuity intact bilaterally,  pupils equal round and reactive to light [Cranial Nerves Oculomotor (III)] : extraocular motion intact [Cranial Nerves Trigeminal (V)] : facial sensation intact symmetrically [Cranial Nerves Facial (VII)] : face symmetrical [Cranial Nerves Vestibulocochlear (VIII)] : hearing was intact bilaterally [Cranial Nerves Accessory (XI - Cranial And Spinal)] : head turning and shoulder shrug symmetric [Cranial Nerves Hypoglossal (XII)] : there was no tongue deviation with protrusion [Motor Strength] : muscle strength was normal in all four extremities [Sensation Tactile Decrease] : light touch was intact

## 2021-01-19 NOTE — CONSULT LETTER
[Dear  ___] : Dear  [unfilled], [Consult Letter:] : I had the pleasure of evaluating your patient, [unfilled]. [Please see my note below.] : Please see my note below. [Consult Closing:] : Thank you very much for allowing me to participate in the care of this patient.  If you have any questions, please do not hesitate to contact me. [Sincerely,] : Sincerely, [FreeTextEntry2] : Abdon Ku MD\par [address] [place] [address]\[place] [id number] [FreeTextEntry3] : Parker Cortes MD, FACS, FAANS\par Professor and \par Department of Neurosurgery\par [facility]\par [address] [place] [address], [address] [place] [address]\[place] [address]\par [phone] Clinical\par [phone] Academic\par  [DrHenrique  ___] : Dr. RATLIFF [DrHenrique ___] : Dr. RATLIFF Attending Attestation (For Attendings USE Only)...

## 2021-01-19 NOTE — ASSESSMENT
[FreeTextEntry1] : \par IMPRESSION:\par \par Severe primary focal hyperhidrosis - palmar, axillary and pedal\par Failure of medical treatments\par \par \par PLAN:\par \par 1: Symptom pattern indicates that he is a good candidate for endoscopic transthoracic sympathectomy\par 2: Proposed surgery will be done along with thoracic surgeon Ruben Garcia MD.\par 3: Explained the risks and benefits of surgery in great detail. The risks include but are not limited to compensatory hyperhidrosis over the trunk and limbs, failure to solve the problem, droopy eyelids (Forrest's syndrome), hemorrhage, and cardiac events among others. Post-op course and expectations were discussed in detail. Patient will have two small incisions under each axilla and usually go home the same day. Should be able to go back to work in a few days.\par 4: Surgery will most likely helps the hands.The effects on the axillae and the feet are less predictable.\par 5: All questions and concerns were addressed.\par 6.:Patient will think more about the surgery and let us know if he would like to proceed. \par \par Telehealth Visit  from 11:10 to 12:05 pm

## 2021-01-19 NOTE — HISTORY OF PRESENT ILLNESS
[FreeTextEntry1] : "Chiari Malformation: [de-identified] : Mr. Jose Fraga is a pleasant 35 year old gentleman who presents for consultation regarding hyperhidrosis that occurs mostly in the hands.\par \par He states that he smokes "a lot of marijuana" as this is the only treatment he has found thus far.  The palmar sweating stops for about 1.5 hours after smoking.

## 2021-01-19 NOTE — HISTORY OF PRESENT ILLNESS
[Home] : at home, [unfilled] , at the time of the visit. [Verbal consent obtained from patient] : the patient, [unfilled] [Medical Office: ([facility])___] : at the medical office located in  [Other: ___] : [unfilled] [FreeTextEntry1] : excessive sweating  [de-identified] : Mr. Jose Fraga is a pleasant 35 year old gentleman of / [place]  origin, who presents for consultation regarding hyperhidrosis that occurs mostly in the hands, axillae  and feet.Condition worsened after his puberty when he started sweating heavily in both hands,  feet and axillae.  The symptoms affect his social and professional interactions as he works as a . He has no dermatologist, but has tried several types of therapies  through his PCP, such as hand roll-ons, creams, anti-anxiety medication, acupunctures,  Botox injections, and also iontophoresis which all have given only temporary relief. The iontophoresis used 1 hour every 2-3 days, but then stopped working few weeks later. He further denies any other type of sensorimotor disturbances, but agrees family history of excessive sweating with his aunt and  his cousin. He states that he smokes "a lot of marijuana" as this is the only treatment he has found thus far. The sweating stops for about 1.5 hours after smoking. However he is concerned about the long-term effects of smoking marihuana 3 times a day for many years. No family history of hyperhidrosis except in a cousin.\par \par Today he  c/o sweating heavily in both hands 10/10), feet 10/10, and axillae (10/10). Symptoms are worsened by cold and stress. He denies having abnormal sweating in his abdomen, groin or thighs. Slightly increased in the chest.\par \par He has PMH of Chiari Malformation under management by  Canelo Jha MD\par \par PCP - Dr. Abdon Ku, [address] [place] [address], phone : [phone]\par \par

## 2021-01-19 NOTE — ASSESSMENT
[FreeTextEntry1] : IMPRESSION\par 1. HYperhidrosis with palmar sweating both hands.\par \par \par PLAN:\par 1. Refer to Dr. Cortes.\par

## 2021-01-21 ENCOUNTER — NON-APPOINTMENT (OUTPATIENT)
Age: 36
End: 2021-01-21

## 2021-01-27 ENCOUNTER — APPOINTMENT (OUTPATIENT)
Dept: CARDIOLOGY | Facility: CLINIC | Age: 36
End: 2021-01-27
Payer: COMMERCIAL

## 2021-01-27 PROCEDURE — 99072 ADDL SUPL MATRL&STAF TM PHE: CPT

## 2021-01-27 PROCEDURE — 93224 XTRNL ECG REC UP TO 48 HRS: CPT

## 2021-01-28 ENCOUNTER — NON-APPOINTMENT (OUTPATIENT)
Age: 36
End: 2021-01-28

## 2021-02-01 ENCOUNTER — NON-APPOINTMENT (OUTPATIENT)
Age: 36
End: 2021-02-01

## 2021-02-10 ENCOUNTER — APPOINTMENT (OUTPATIENT)
Dept: CARDIOLOGY | Facility: CLINIC | Age: 36
End: 2021-02-10
Payer: COMMERCIAL

## 2021-02-10 PROCEDURE — 93306 TTE W/DOPPLER COMPLETE: CPT

## 2021-02-10 PROCEDURE — 99072 ADDL SUPL MATRL&STAF TM PHE: CPT

## 2021-02-17 ENCOUNTER — APPOINTMENT (OUTPATIENT)
Dept: NEUROSURGERY | Facility: CLINIC | Age: 36
End: 2021-02-17

## 2021-03-10 ENCOUNTER — APPOINTMENT (OUTPATIENT)
Dept: FAMILY MEDICINE | Facility: CLINIC | Age: 36
End: 2021-03-10
Payer: COMMERCIAL

## 2021-03-10 VITALS
HEART RATE: 82 BPM | WEIGHT: 205 LBS | BODY MASS INDEX: 27.17 KG/M2 | RESPIRATION RATE: 16 BRPM | OXYGEN SATURATION: 99 % | SYSTOLIC BLOOD PRESSURE: 118 MMHG | TEMPERATURE: 97.7 F | HEIGHT: 73 IN | DIASTOLIC BLOOD PRESSURE: 86 MMHG

## 2021-03-10 DIAGNOSIS — G93.5 COMPRESSION OF BRAIN: ICD-10-CM

## 2021-03-10 DIAGNOSIS — R61 GENERALIZED HYPERHIDROSIS: ICD-10-CM

## 2021-03-10 DIAGNOSIS — H92.09 OTALGIA, UNSPECIFIED EAR: ICD-10-CM

## 2021-03-10 DIAGNOSIS — R09.81 NASAL CONGESTION: ICD-10-CM

## 2021-03-10 PROCEDURE — 99072 ADDL SUPL MATRL&STAF TM PHE: CPT

## 2021-03-10 PROCEDURE — 99214 OFFICE O/P EST MOD 30 MIN: CPT

## 2021-03-10 RX ORDER — ALBUTEROL SULFATE 90 UG/1
108 (90 BASE) AEROSOL, METERED RESPIRATORY (INHALATION)
Qty: 18 | Refills: 0 | Status: COMPLETED | COMMUNITY
Start: 2019-05-14 | End: 2019-05-29

## 2021-03-10 NOTE — ASSESSMENT
[FreeTextEntry1] : Ear Pain/ Sinus congestion\par Allegra D/ Flonase.\par discontinue pseudo fed ( Take plain allegra if palpitations of elevated BP)\par Flonase\par \par THC/ Smokes Marijuane\par Advised to quit\par Smokes cigarettes occasionally\par Consider quitting\par \par Hyperhidrosis: seen by Neurosurgeon Dr Cortes, considering Sympathectomy.\par Chiari Malformation type 1 Seeing Dr Jha for follow up.

## 2021-03-10 NOTE — HISTORY OF PRESENT ILLNESS
[Earache (L)] : pain in left ear [Earache (R)] : pain in right ear [Moderate] : moderate [___ Days ago] : [unfilled] days ago [Constant] : constant [Congestion] : congestion [Earache] : earache [Improving] : improving [Cough] : no cough [Sore Throat] : no sore throat [Wheezing] : no wheezing [Chills] : no chills [Anorexia] : no anorexia [Shortness Of Breath] : no shortness of breath [Fatigue] : not fatigue [Headache] : no headache [Fever] : no fever [FreeTextEntry8] : H

## 2022-03-30 ENCOUNTER — NON-APPOINTMENT (OUTPATIENT)
Age: 37
End: 2022-03-30

## 2024-12-19 NOTE — ED PROVIDER NOTE - SKIN, MLM
If you are a smoker, it is important for your health to stop smoking. Please be aware that second hand smoke is also harmful.
Skin normal color for race, warm, dry and intact. No evidence of rash.
